# Patient Record
Sex: FEMALE | Race: WHITE | NOT HISPANIC OR LATINO | Employment: UNEMPLOYED | URBAN - NONMETROPOLITAN AREA
[De-identification: names, ages, dates, MRNs, and addresses within clinical notes are randomized per-mention and may not be internally consistent; named-entity substitution may affect disease eponyms.]

---

## 2017-08-04 ENCOUNTER — TRANSCRIBE ORDERS (OUTPATIENT)
Dept: ADMINISTRATIVE | Facility: HOSPITAL | Age: 42
End: 2017-08-04

## 2017-08-04 DIAGNOSIS — Z12.31 VISIT FOR SCREENING MAMMOGRAM: Primary | ICD-10-CM

## 2017-11-02 ENCOUNTER — TRANSCRIBE ORDERS (OUTPATIENT)
Dept: ADMINISTRATIVE | Facility: HOSPITAL | Age: 42
End: 2017-11-02

## 2017-11-02 DIAGNOSIS — Z12.31 VISIT FOR SCREENING MAMMOGRAM: Primary | ICD-10-CM

## 2017-11-27 ENCOUNTER — HOSPITAL ENCOUNTER (OUTPATIENT)
Dept: MAMMOGRAPHY | Facility: HOSPITAL | Age: 42
Discharge: HOME | End: 2017-11-27
Attending: INTERNAL MEDICINE | Admitting: INTERNAL MEDICINE

## 2017-11-27 DIAGNOSIS — Z12.31 VISIT FOR SCREENING MAMMOGRAM: ICD-10-CM

## 2017-11-27 PROCEDURE — 77067 SCR MAMMO BI INCL CAD: CPT | Performed by: RADIOLOGY

## 2017-11-27 PROCEDURE — 77063 BREAST TOMOSYNTHESIS BI: CPT | Performed by: RADIOLOGY

## 2017-11-27 PROCEDURE — 77063 BREAST TOMOSYNTHESIS BI: CPT

## 2017-11-27 PROCEDURE — G0202 SCR MAMMO BI INCL CAD: HCPCS

## 2018-06-04 ENCOUNTER — DOCUMENTATION (OUTPATIENT)
Dept: BARIATRICS/WEIGHT MGMT | Facility: CLINIC | Age: 43
End: 2018-06-04

## 2018-06-04 DIAGNOSIS — R73.03 PREDIABETES: ICD-10-CM

## 2018-06-04 DIAGNOSIS — R06.00 DYSPNEA, UNSPECIFIED TYPE: Primary | ICD-10-CM

## 2018-06-04 DIAGNOSIS — R53.83 FATIGUE, UNSPECIFIED TYPE: ICD-10-CM

## 2018-06-11 DIAGNOSIS — R53.83 FATIGUE, UNSPECIFIED TYPE: ICD-10-CM

## 2018-06-11 DIAGNOSIS — R73.03 PREDIABETES: ICD-10-CM

## 2018-06-11 DIAGNOSIS — R06.00 DYSPNEA, UNSPECIFIED TYPE: ICD-10-CM

## 2018-06-12 ENCOUNTER — OFFICE VISIT (OUTPATIENT)
Dept: PSYCHIATRY | Facility: CLINIC | Age: 43
End: 2018-06-12

## 2018-06-12 ENCOUNTER — OFFICE VISIT (OUTPATIENT)
Dept: BARIATRICS/WEIGHT MGMT | Facility: CLINIC | Age: 43
End: 2018-06-12

## 2018-06-12 VITALS
TEMPERATURE: 97.6 F | SYSTOLIC BLOOD PRESSURE: 159 MMHG | OXYGEN SATURATION: 99 % | DIASTOLIC BLOOD PRESSURE: 87 MMHG | BODY MASS INDEX: 47.09 KG/M2 | HEART RATE: 85 BPM | HEIGHT: 66 IN | RESPIRATION RATE: 18 BRPM | WEIGHT: 293 LBS

## 2018-06-12 DIAGNOSIS — R10.13 DYSPEPSIA: Primary | ICD-10-CM

## 2018-06-12 DIAGNOSIS — E66.01 MORBID OBESITY WITH BMI OF 60.0-69.9, ADULT (HCC): ICD-10-CM

## 2018-06-12 DIAGNOSIS — J30.2 SEASONAL ALLERGIC RHINITIS, UNSPECIFIED CHRONICITY, UNSPECIFIED TRIGGER: ICD-10-CM

## 2018-06-12 DIAGNOSIS — R10.13 DYSPEPSIA: ICD-10-CM

## 2018-06-12 DIAGNOSIS — G43.909 MIGRAINE WITHOUT STATUS MIGRAINOSUS, NOT INTRACTABLE, UNSPECIFIED MIGRAINE TYPE: ICD-10-CM

## 2018-06-12 DIAGNOSIS — E66.01 MORBID OBESITY (HCC): ICD-10-CM

## 2018-06-12 DIAGNOSIS — I10 HYPERTENSION, UNSPECIFIED TYPE: Primary | ICD-10-CM

## 2018-06-12 DIAGNOSIS — F32.A DEPRESSION, UNSPECIFIED DEPRESSION TYPE: ICD-10-CM

## 2018-06-12 DIAGNOSIS — R60.0 LOWER EXTREMITY EDEMA: ICD-10-CM

## 2018-06-12 DIAGNOSIS — F41.9 ANXIETY: ICD-10-CM

## 2018-06-12 DIAGNOSIS — R73.03 PREDIABETES: ICD-10-CM

## 2018-06-12 DIAGNOSIS — R12 HEARTBURN: ICD-10-CM

## 2018-06-12 DIAGNOSIS — F32.A DEPRESSION, UNSPECIFIED DEPRESSION TYPE: Primary | ICD-10-CM

## 2018-06-12 DIAGNOSIS — R06.02 SHORTNESS OF BREATH: ICD-10-CM

## 2018-06-12 DIAGNOSIS — G47.33 OSA (OBSTRUCTIVE SLEEP APNEA): ICD-10-CM

## 2018-06-12 DIAGNOSIS — M25.50 ARTHRALGIA, UNSPECIFIED JOINT: ICD-10-CM

## 2018-06-12 DIAGNOSIS — R00.2 PALPITATIONS: ICD-10-CM

## 2018-06-12 LAB
ALBUMIN SERPL-MCNC: 3.95 G/DL (ref 3.2–4.8)
ALBUMIN/GLOB SERPL: 1.3 G/DL (ref 1.5–2.5)
ALP SERPL-CCNC: 94 U/L (ref 25–100)
ALT SERPL-CCNC: 15 U/L (ref 7–40)
AST SERPL-CCNC: 16 U/L (ref 0–33)
BILIRUB SERPL-MCNC: 0.4 MG/DL (ref 0.3–1.2)
BUN SERPL-MCNC: 11 MG/DL (ref 9–23)
BUN/CREAT SERPL: 17.5 (ref 7–25)
CALCIUM SERPL-MCNC: 8.7 MG/DL (ref 8.7–10.4)
CHLORIDE SERPL-SCNC: 99 MMOL/L (ref 99–109)
CHOLEST SERPL-MCNC: 160 MG/DL (ref 0–200)
CO2 SERPL-SCNC: 26 MMOL/L (ref 20–31)
CREAT SERPL-MCNC: 0.63 MG/DL (ref 0.6–1.3)
ERYTHROCYTE [DISTWIDTH] IN BLOOD BY AUTOMATED COUNT: 14.1 % (ref 11.3–14.5)
GFR SERPLBLD CREATININE-BSD FMLA CKD-EPI: 103 ML/MIN/1.73
GFR SERPLBLD CREATININE-BSD FMLA CKD-EPI: 125 ML/MIN/1.73
GLOBULIN SER CALC-MCNC: 3.2 GM/DL
GLUCOSE SERPL-MCNC: 106 MG/DL (ref 70–100)
HBA1C MFR BLD: 5.9 % (ref 4.8–5.6)
HCT VFR BLD AUTO: 37.7 % (ref 34.5–44)
HDLC SERPL-MCNC: 48 MG/DL (ref 40–60)
HGB BLD-MCNC: 11.8 G/DL (ref 11.5–15.5)
LDLC SERPL CALC-MCNC: 67 MG/DL (ref 0–100)
MCH RBC QN AUTO: 26.4 PG (ref 27–31)
MCHC RBC AUTO-ENTMCNC: 31.3 G/DL (ref 32–36)
MCV RBC AUTO: 84.3 FL (ref 80–99)
PLATELET # BLD AUTO: 318 10*3/MM3 (ref 150–450)
POTASSIUM SERPL-SCNC: 4 MMOL/L (ref 3.5–5.5)
PROT SERPL-MCNC: 7.1 G/DL (ref 5.7–8.2)
RBC # BLD AUTO: 4.47 10*6/MM3 (ref 3.89–5.14)
SODIUM SERPL-SCNC: 135 MMOL/L (ref 132–146)
TRIGL SERPL-MCNC: 223 MG/DL (ref 0–150)
TSH SERPL DL<=0.005 MIU/L-ACNC: 2.22 MIU/ML (ref 0.35–5.35)
VLDLC SERPL CALC-MCNC: 44.6 MG/DL
WBC # BLD AUTO: 9.06 10*3/MM3 (ref 3.5–10.8)

## 2018-06-12 PROCEDURE — 99204 OFFICE O/P NEW MOD 45 MIN: CPT | Performed by: PHYSICIAN ASSISTANT

## 2018-06-12 PROCEDURE — 90791 PSYCH DIAGNOSTIC EVALUATION: CPT | Performed by: PSYCHOLOGIST

## 2018-06-12 RX ORDER — METHOCARBAMOL 750 MG/1
750 TABLET, FILM COATED ORAL 2 TIMES DAILY
Refills: 0 | COMMUNITY
Start: 2018-05-10

## 2018-06-12 RX ORDER — VENLAFAXINE HYDROCHLORIDE 75 MG/1
75 CAPSULE, EXTENDED RELEASE ORAL DAILY
Refills: 0 | COMMUNITY
Start: 2018-05-10 | End: 2021-06-28 | Stop reason: ALTCHOICE

## 2018-06-12 RX ORDER — SODIUM CHLORIDE 9 MG/ML
100 INJECTION, SOLUTION INTRAVENOUS CONTINUOUS
Status: CANCELLED | OUTPATIENT
Start: 2018-06-12

## 2018-06-12 RX ORDER — IBUPROFEN 800 MG/1
800 TABLET ORAL EVERY 6 HOURS PRN
COMMUNITY

## 2018-06-12 RX ORDER — MONTELUKAST SODIUM 10 MG/1
10 TABLET ORAL DAILY
Refills: 0 | COMMUNITY
Start: 2018-05-17

## 2018-06-12 NOTE — PROGRESS NOTES
PROGRESS NOTE    Data:  Ashley Bartlett is a 42 y.o. female who met with the undersigned for a scheduled individual outpatient therapy session from 1:00 - 1:44pm.      Clinical Maneuvering/Intervention:      Pt talked about struggling with being over weight since about 5th grade. She is primarily motivated to get weight loss surgery/lose weight so that she can be more active in life and to improve her overall health. The pt talked about how she has thought about getting the surgery for the past 15 years, but reached a turning point where she gathered more information, started seeing the surgery as a possibility, and in turn, committing to the journey in order to get it.  A psychological evaluation was conducted in order to assess past and current level of functioning. Areas assessed included, but were not limited to: perception of social support, perception of ability to face and deal with challenges in life (positive functioning), anxiety symptoms, depressive symptoms, perspective on beliefs/belief system, coping skills for stress, intelligence level, etc. Therapeutic rapport was established. She has battled depression quite a bit for the past three years which she attributes to losing her brother 3 years ago, her father this past January, and then losing several other family members since then. She then provided examples of how she is coping and healing from these losses. Stressors in life were deemed minimal: her sister's children living with her, arguing family members, and money being tight (but per pt, they make ends meet). Interventions conducted today were geared towards assessing her readiness for weight loss surgery and highlighting any counseling needs. She reports different things she is doing in order to heal from the loss of loved ones and how she manages depressive symptoms; thus, major counseling issues needing attention were not identified. She has not started making many dietary changes since, per  pt, she just learned today about how she needs to change her diet. She was able to describe several people who support her in getting weight loss surgery.     Mental Status Exam  Hygiene:  good  Dress: normal  Attitude:  cooperative and proactive  Motor Activity: normal  Speech: normal  Mood:   level  Affect:  congruent  Thought Processes: normal  Thought Content:  normal  Suicidal Thoughts:  not endorsed  Homicidal Thoughts:  not endorsed  Crisis Safety Plan: not needed   Hallucinations:  none      Patient's Support Network Includes:  family, friends      Progress toward goal: there is evidence to suggest that she is taking measures to improve the quality of her life including seeking weight loss surgery       Functional Status: moderate      Prognosis: good with weight loss and continuing to take medication to manage mood    Assessment      The pt presented with low-grade depression due primarily to loss of several loved ones. She is also morbidly obese. She presented with good coping skills for stress and signs that she is healing from her losses.     From a psychological standpoint, the pt presents as a good candidate for bariatric surgery.  She is motivated for the surgery, has showed willingness for the lifestyle change in terms of adjusting her eating habits, and seems to have appropriate expectations of how to prepare and how to live after surgery in order to lose weight successfully.      Plan      In order to diminish symptoms of depression, the pt will continue to take mood medication as prescribed (ongoing) and follow up with her bariatric surgeon in order to receive weight loss surgery (as soon as feasible/appropriate).     Aranza Holt, PhD, LP

## 2018-06-12 NOTE — PROGRESS NOTES
[facility]  [address]  [facility] [id number] [phone]      Patient  Name:  Ashley Bartlett  :  1975      Date of Visit: 2018      Chief Complaint:  weight gain; unable to maintain weight loss    History of Present Illness:  Ashley Barltett is a 42 y.o. female who presents today for evaluation, education and consultation regarding weight loss surgery. The patient is interested in sleeve gastrectomy.     Ashley has been overweight for at least 33 years, has been 35 pounds or more overweight for at least 33 years, has been 100 pounds or more overweight for 25 or more years and started dieting at age 14.      Previous diet attempts include: Cabbage Soup, Fasting and Slim Fast; None; Wellbutrin.  The most weight Ashley lost was 60 pounds on exercise but was only able to maintain that weight loss for 6 months.  Her maximum lifetime weight is 415 pounds.    As above, patient has been overweight for many years, with numerous failed dietary/weight loss attempts.  She now has obesity related comorbidities and as such has decided to pursue weight loss surgery. Purusing WLS to better her health and be able to do more things.  She knows someone who had surgery with Dr. Alves and is doing well.     GI history: occasional heartburn, takes tums/ rolaids PRN.  Has had EGD many years ago, unremarkable. S/p lap laney. No current GI complaints.     All past medical, surgical, social and family history have been obtained and discussed as pertinent to bariatric surgery as below.     Past Medical History:   Diagnosis Date   • Anxiety    • Boil     h/o multiple boils, has required I&D, unknown staph or MRSA   • Depression    • Dyspepsia    • Fatigue    • Heartburn     tums/ rolaids prn, EGD years ago- unremarkable.  Denies h/o h pylori.    • Hypertension    • Joint pain     knees, hips, shoulders, elbows, wrists.   Takes ibuprofen PRN, muscle relaxers. No injections.     • Lower extremity edema    • Migraines     ibuprofen or tylenol PRN.    • Morbid obesity with BMI of 60.0-69.9, adult    • JAY (obstructive sleep apnea)     does not use CPAP   • Palpitations     negative holter monitor   • Paresthesia of both hands    • Prediabetes     was on metformin, is off this.     • Seasonal allergies    • Shortness of breath     wtih exertion     Past Surgical History:   Procedure Laterality Date   • BREAST BIOPSY Right 2015    fibroadenoma   •  SECTION  ,     midline   • LAPAROSCOPIC CHOLECYSTECTOMY      cholelithiasis   • TONSILLECTOMY     • TUBAL ABDOMINAL LIGATION      with  section       No Known Allergies    Current Outpatient Prescriptions:   •  ibuprofen (ADVIL,MOTRIN) 200 MG tablet, Take 200 mg by mouth Every 6 (Six) Hours As Needed for Mild Pain ., Disp: , Rfl:   •  lisinopril-hydrochlorothiazide (PRINZIDE,ZESTORETIC) 10-12.5 MG per tablet, Take 1 tablet by mouth daily., Disp: , Rfl:   •  loratadine (CLARITIN) 10 MG tablet, Take 10 mg by mouth daily., Disp: , Rfl:   •  methocarbamol (ROBAXIN) 750 MG tablet, , Disp: , Rfl: 0  •  montelukast (SINGULAIR) 10 MG tablet, , Disp: , Rfl: 0  •  tiZANidine (ZANAFLEX) 4 MG tablet, Take 4 mg by mouth at night as needed for muscle spasms., Disp: , Rfl:   •  venlafaxine XR (EFFEXOR-XR) 75 MG 24 hr capsule, , Disp: , Rfl: 0  •  escitalopram (LEXAPRO) 10 MG tablet, Take 10 mg by mouth daily., Disp: , Rfl:   •  ondansetron ODT (ZOFRAN-ODT) 4 MG disintegrating tablet, Take 1 tablet by mouth Every 6 (Six) Hours As Needed for nausea or vomiting., Disp: 12 tablet, Rfl: 0    Social History     Social History   • Marital status:      Spouse name: N/A   • Number of children: 2   • Years of education: High School      Occupational History   • Not on file.     Social History Main Topics   • Smoking status: Never Smoker   • Smokeless tobacco: Never Used   • Alcohol use Yes      Comment: very little, 4oz in 6  months   • Drug use: No   • Sexual activity: Defer      Comment: no hormones     Other Topics Concern   • Not on file     Social History Narrative    [place] [facility] with mother.   Disabled from knee and joint pain.     Family History   Problem Relation Age of Onset   • Diabetes Mother    • Hypertension Mother    • Diabetes Father    • Hypertension Father    • Obesity Maternal Grandmother    • Diabetes Maternal Grandmother    • Hypertension Maternal Grandmother    • Heart attack Maternal Grandmother    • Diabetes Maternal Grandfather    • Heart attack Maternal Grandfather    • Obesity Paternal Grandmother    • Hypertension Paternal Grandmother    • Heart attack Paternal Grandmother    • Hypertension Paternal Grandfather    • Heart attack Paternal Grandfather    • Breast cancer Neg Hx        Review of Systems:  Constitutional:  Reports fatigue, weight gain and denies fevers, chills.  HEENT:  seasonal allergies  Cardiovascular:  Reports HTN, palpitations, edema and denies HLD, CAD, Atrial Fib, hx heart disease, heart murmur, hx MI, chest pain, hx DVT.  Respiratory:  Reports shortness of breath , sleep apnea and denies cough , wheezing, asthma, hx PE.  Gastrointestinal:  Reports heartburn, gallbladder issues and denies dysphagia, nausea, vomiting, abdominal pain, IBS, diarrhea, constipation, melena, blood in stool, liver disease, hx pancreatitis.  Genitourinary:  Reports none and denies history of  frequent UTI, incontinence, hematuria, dysuria, polyuria, polydipsia, renal insufficiency, renal failure.    Musculoskeletal:  Reports joint pain, back pain and denies fibromyalgia, arthritis and autoimmune disease.  Neurological:  Reports headaches, migraines and denies numbness /tingling, dizziness, confusion, seizure, stroke.  Psychiatric:  Reports anxiety, depression and denies bipolar disorder, hx suicide attempt, hx self injury, eating disorder.  Endocrine:  Reports glucose intolerance and denies  diabetes, thyroid disease, gout.  Hematologic:  Reports none and denies anemia, bleeding disorder, hx blood transfusion.  Skin:  Reports boils and denies hx MRSA.      Physical Exam:  Vital Signs:  Weight: (!) 179 kg (394 lb 0.2 oz)   Body mass index is 63.59 kg/m².  Temp: 97.6 °F (36.4 °C)   Heart Rate: 85   BP: 159/87     Physical Exam   Constitutional: She is oriented to person, place, and time. She appears well-developed and well-nourished.   Yawning repeatedly   HENT:   Head: Normocephalic and atraumatic.   Mouth/Throat: Oropharynx is clear and moist.   Eyes: EOM are normal.   Neck: Normal range of motion. Neck supple. No thyromegaly present.   Cardiovascular: Normal rate, regular rhythm and normal heart sounds.    Pulmonary/Chest: Effort normal and breath sounds normal. No respiratory distress. She has no wheezes.   Abdominal: Soft. Bowel sounds are normal. She exhibits no distension. There is no tenderness.   Lower midline scar. Lap scars.   Musculoskeletal: Normal range of motion. She exhibits edema.   Neurological: She is alert and oriented to person, place, and time.   Skin: Skin is warm and dry.   tattoo   Psychiatric: She has a normal mood and affect. Her behavior is normal. Judgment and thought content normal.   Vitals reviewed.      Patient Active Problem List   Diagnosis   • Anxiety   • JAY (obstructive sleep apnea)   • Hypertension   • Depression   • Seasonal allergies   • Joint pain   • Heartburn   • Lower extremity edema   • Shortness of breath   • Paresthesia of both hands   • Palpitations   • Migraines   • Prediabetes   • Boil   • Fatigue   • Morbid obesity with BMI of 60.0-69.9, adult   • Dyspepsia       Assessment:    Ashley Bartlett is a 42 y.o. year old female with medically complicated obesity pursuing sleeve gastrectomy.    Weight loss surgery is deemed medically necessary given the following obesity related comorbidities including sleep apnea, hypertension, back pain, knee pain, GERD, gall  bladder disease, edema and depression with current Weight: (!) 179 kg (394 lb 0.2 oz) and Body mass index is 63.59 kg/m²..    Plan:  The consultation plan and program requirements were reviewed with the patient.  The patient has been advised that a letter of medical support must be obtained from her primary care physician or referring provider. A psychological evaluation will be arranged.  A nutritional evaluation will be performed.  The patient was advised to start a high protein and low carbohydrate diet.  Necessary lifestyle modifications were discussed.  Instructions on how to access JIMENA was given to the patient.  JIMENA is an internet based educational video that explains the surgical procedure chosen and answers basic questions regarding that procedure.     Preoperative testing will include: CBC, CMP, Lipids, TSH, HgA1C, H.Pylori, CXR, EKG and EGD     Additional preop clearances required prior to surgery: Cardiac. Patient will schedule with home cardiologist.      The patient has been educated on expected postoperative lifestyle changes, including commitment to high protein diet, vitamin regimen, and exercise program.  They are aware that support groups are encouraged for optimal weight loss results. Patient understands that bariatric surgery is not cosmetic surgery but rather a tool to help make a lifelong commitment to lifestyle changes including diet, exercise, behavior modifications, and healthy habits. The procedure was discussed with the patient and all questions were answered. The importance of avoiding ASA/ NSAIDS/ steroids/ tobacco/ hormones/ immunomodulators perioperatively was discussed.         Ysabel Gilliland PA-C

## 2018-06-13 ENCOUNTER — DOCUMENTATION (OUTPATIENT)
Dept: BARIATRICS/WEIGHT MGMT | Facility: CLINIC | Age: 43
End: 2018-06-13

## 2018-06-13 NOTE — PROGRESS NOTES
"Weight Loss Surgery  Presurgical Nutrition Assessment     Ashley Bartlett  2018 (Dietitian Consult /c pt on 2018  [id number]  [id number]  1975  female    Surgery desired: Sleeve Gastrectomy    Ht 167.7 cm (66\"); Wt 179 kg (394 #); BMI 63.6  Past Medical History:   Diagnosis Date   • Anxiety    • Boil     h/o multiple boils, has required I&D, unknown staph or MRSA   • Depression    • Dyspepsia    • Fatigue    • Heartburn     tums/ rolaids prn, EGD years ago- unremarkable.  Denies h/o h pylori.    • Hypertension    • Joint pain     knees, hips, shoulders, elbows, wrists.   Takes ibuprofen PRN, muscle relaxers. No injections.    • Lower extremity edema    • Migraines     ibuprofen or tylenol PRN.    • Morbid obesity with BMI of 60.0-69.9, adult    • JAY (obstructive sleep apnea)     does not use CPAP   • Palpitations     negative holter monitor/ cardiac workup. Has seen cardiologist a year ago.    • Paresthesia of both hands    • Prediabetes     was on metformin, better controlled and is off this now.    • Seasonal allergies    • Shortness of breath     wtih exertion     Past Surgical History:   Procedure Laterality Date   • BREAST BIOPSY Right 2015    fibroadenoma   •  SECTION  ,     midline   • LAPAROSCOPIC CHOLECYSTECTOMY      cholelithiasis   • TONSILLECTOMY     • TUBAL ABDOMINAL LIGATION      with  section     No Known Allergies    Current Outpatient Prescriptions:   •  escitalopram (LEXAPRO) 10 MG tablet, Take 10 mg by mouth daily., Disp: , Rfl:   •  ibuprofen (ADVIL,MOTRIN) 200 MG tablet, Take 200 mg by mouth Every 6 (Six) Hours As Needed for Mild Pain ., Disp: , Rfl:   •  lisinopril-hydrochlorothiazide (PRINZIDE,ZESTORETIC) 10-12.5 MG per tablet, Take 1 tablet by mouth daily., Disp: , Rfl:   •  loratadine (CLARITIN) 10 MG tablet, Take 10 mg by mouth daily., Disp: , Rfl:   •  methocarbamol (ROBAXIN) 750 MG tablet, , Disp: , Rfl: 0  •  montelukast (SINGULAIR) 10 " "MG tablet, , Disp: , Rfl: 0  •  ondansetron ODT (ZOFRAN-ODT) 4 MG disintegrating tablet, Take 1 tablet by mouth Every 6 (Six) Hours As Needed for nausea or vomiting., Disp: 12 tablet, Rfl: 0  •  tiZANidine (ZANAFLEX) 4 MG tablet, Take 4 mg by mouth at night as needed for muscle spasms., Disp: , Rfl:   •  venlafaxine XR (EFFEXOR-XR) 75 MG 24 hr capsule, , Disp: , Rfl: 0      Nutrition Assessment    Estimated energy needs:  2460 kcal    Estimated calories for weight loss:  1500 kcal    IBW (Pounds):  155 #        Excess body weight (Pounds):  240 #       Nutrition Recall  24 Hour recall: (B) (L) (D) -  Reviewed and discussed with patient.  Drinks 16 - 32 oz Pepsi qd @ around 8:30 am.  From 6-6:45 pt had \"breakfast for dinner\" /c 2 biscuits, 2 eggs, 2 slices carpenter & 4 thin slices of ham (~ 2 -oz).  Dessert was 1 piece strawberry cheesecake.  At 9 pm had 1.5 c.fresh pineapple.  \"Chomps\" on ice throughout the day. Soda intake very excessive.  Portion sizes large.  Sub-optimal protein intake.       Exercise  never      Education    Provided information packet re:  Sleeve Gastrectomy  1. Reviewed guidelines for higher protein, limited carbohydrate diet to promote weight loss.  Encouraged patient to incorporate these principles of healthy eating from now until approximately 2 weeks prior to bariatric surgery date, when an even lower carbohydrate “liver-shrinking” regimen will be followed. (Information sheet re pre-op diet given).  Explained that after recovery from surgery this diet will again be followed to ensure further loss and for weight maintenance.    2. Encouraged patient to choose an acceptable protein supplement powder or shake for post-surgery liquid diet.  Provided product guidelines and examples.    3. Explained importance of goal setting to help in changing eating behaviors that are not conducive to weight loss.  Targeted several on a worksheet which also included spaces for patient to work on issues specific " to them.  4. Provided follow-up options for support, including contact information for dietitians here, if desired.  Web-based support information and apps for smart phones and computers given.  Noted that monthly support group is offered at this clinic, and that support is associated with successful weight loss.    Recommend that team proceed with surgery and follow per protocol.      Nutrition Goals   Dietary Guidelines per information packet as described above  Protein goal:  grams per day   Carbohydrate goal:  100-140 grams per day  Eliminate soda, sweet tea, etc.     Exercise Goals  Continue current exercise routine   Add 15-30 minutes of activity per day as tolerated      Ana Varela, ESTHER  06/13/2018  4:40 PM

## 2018-07-01 ENCOUNTER — APPOINTMENT (OUTPATIENT)
Dept: PREADMISSION TESTING | Facility: HOSPITAL | Age: 43
End: 2018-07-01

## 2018-07-01 LAB
DEPRECATED RDW RBC AUTO: 40.9 FL (ref 37–54)
ERYTHROCYTE [DISTWIDTH] IN BLOOD BY AUTOMATED COUNT: 13.3 % (ref 11.3–14.5)
HCT VFR BLD AUTO: 37.6 % (ref 34.5–44)
HGB BLD-MCNC: 12 G/DL (ref 11.5–15.5)
MCH RBC QN AUTO: 27.1 PG (ref 27–31)
MCHC RBC AUTO-ENTMCNC: 31.9 G/DL (ref 32–36)
MCV RBC AUTO: 85.1 FL (ref 80–99)
PLATELET # BLD AUTO: 315 10*3/MM3 (ref 150–450)
PMV BLD AUTO: 10.4 FL (ref 6–12)
RBC # BLD AUTO: 4.42 10*6/MM3 (ref 3.89–5.14)
WBC NRBC COR # BLD: 8.28 10*3/MM3 (ref 3.5–10.8)

## 2018-07-01 PROCEDURE — 93010 ELECTROCARDIOGRAM REPORT: CPT | Performed by: INTERNAL MEDICINE

## 2018-07-01 PROCEDURE — 36415 COLL VENOUS BLD VENIPUNCTURE: CPT

## 2018-07-01 PROCEDURE — 85027 COMPLETE CBC AUTOMATED: CPT | Performed by: ANESTHESIOLOGY

## 2018-07-01 PROCEDURE — 93005 ELECTROCARDIOGRAM TRACING: CPT

## 2018-07-01 RX ORDER — LISINOPRIL 40 MG/1
60 TABLET ORAL DAILY
COMMUNITY

## 2018-07-01 RX ORDER — HYDROCHLOROTHIAZIDE 25 MG/1
12.5 TABLET ORAL DAILY
COMMUNITY
End: 2021-06-28 | Stop reason: ALTCHOICE

## 2018-07-05 ENCOUNTER — HOSPITAL ENCOUNTER (OUTPATIENT)
Facility: HOSPITAL | Age: 43
Setting detail: HOSPITAL OUTPATIENT SURGERY
Discharge: HOME | End: 2018-07-05
Attending: SURGERY | Admitting: ANESTHESIOLOGY

## 2018-07-05 ENCOUNTER — ANESTHESIA (OUTPATIENT)
Dept: GASTROENTEROLOGY | Facility: HOSPITAL | Age: 43
End: 2018-07-05

## 2018-07-05 ENCOUNTER — ANESTHESIA EVENT (OUTPATIENT)
Dept: GASTROENTEROLOGY | Facility: HOSPITAL | Age: 43
End: 2018-07-05

## 2018-07-05 VITALS
OXYGEN SATURATION: 99 % | TEMPERATURE: 97.8 F | SYSTOLIC BLOOD PRESSURE: 142 MMHG | DIASTOLIC BLOOD PRESSURE: 86 MMHG | HEART RATE: 73 BPM | RESPIRATION RATE: 20 BRPM

## 2018-07-05 DIAGNOSIS — R12 HEARTBURN: ICD-10-CM

## 2018-07-05 LAB
B-HCG UR QL: NEGATIVE
INTERNAL NEGATIVE CONTROL: NORMAL
INTERNAL POSITIVE CONTROL: POSITIVE
Lab: NORMAL

## 2018-07-05 PROCEDURE — 88305 TISSUE EXAM BY PATHOLOGIST: CPT | Performed by: SURGERY

## 2018-07-05 PROCEDURE — 25010000002 PROPOFOL 10 MG/ML EMULSION: Performed by: NURSE ANESTHETIST, CERTIFIED REGISTERED

## 2018-07-05 PROCEDURE — 43239 EGD BIOPSY SINGLE/MULTIPLE: CPT | Performed by: SURGERY

## 2018-07-05 RX ORDER — LIDOCAINE HYDROCHLORIDE 10 MG/ML
0.5 INJECTION, SOLUTION EPIDURAL; INFILTRATION; INTRACAUDAL; PERINEURAL ONCE AS NEEDED
Status: DISCONTINUED | OUTPATIENT
Start: 2018-07-05 | End: 2018-07-05 | Stop reason: HOSPADM

## 2018-07-05 RX ORDER — SODIUM CHLORIDE 9 MG/ML
100 INJECTION, SOLUTION INTRAVENOUS CONTINUOUS
Status: DISCONTINUED | OUTPATIENT
Start: 2018-07-05 | End: 2018-07-05 | Stop reason: HOSPADM

## 2018-07-05 RX ORDER — LIDOCAINE HYDROCHLORIDE 10 MG/ML
INJECTION, SOLUTION EPIDURAL; INFILTRATION; INTRACAUDAL; PERINEURAL AS NEEDED
Status: DISCONTINUED | OUTPATIENT
Start: 2018-07-05 | End: 2018-07-05 | Stop reason: SURG

## 2018-07-05 RX ORDER — PANTOPRAZOLE SODIUM 40 MG/10ML
40 INJECTION, POWDER, LYOPHILIZED, FOR SOLUTION INTRAVENOUS ONCE
Status: COMPLETED | OUTPATIENT
Start: 2018-07-05 | End: 2018-07-05

## 2018-07-05 RX ORDER — ONDANSETRON 2 MG/ML
4 INJECTION INTRAMUSCULAR; INTRAVENOUS ONCE AS NEEDED
Status: DISCONTINUED | OUTPATIENT
Start: 2018-07-05 | End: 2018-07-05 | Stop reason: HOSPADM

## 2018-07-05 RX ORDER — PROPOFOL 10 MG/ML
VIAL (ML) INTRAVENOUS AS NEEDED
Status: DISCONTINUED | OUTPATIENT
Start: 2018-07-05 | End: 2018-07-05 | Stop reason: SURG

## 2018-07-05 RX ORDER — SODIUM CHLORIDE, SODIUM LACTATE, POTASSIUM CHLORIDE, CALCIUM CHLORIDE 600; 310; 30; 20 MG/100ML; MG/100ML; MG/100ML; MG/100ML
9 INJECTION, SOLUTION INTRAVENOUS CONTINUOUS
Status: DISCONTINUED | OUTPATIENT
Start: 2018-07-05 | End: 2018-07-05 | Stop reason: HOSPADM

## 2018-07-05 RX ORDER — SODIUM CHLORIDE 0.9 % (FLUSH) 0.9 %
1-10 SYRINGE (ML) INJECTION AS NEEDED
Status: DISCONTINUED | OUTPATIENT
Start: 2018-07-05 | End: 2018-07-05 | Stop reason: HOSPADM

## 2018-07-05 RX ORDER — FAMOTIDINE 10 MG/ML
20 INJECTION, SOLUTION INTRAVENOUS ONCE
Status: DISCONTINUED | OUTPATIENT
Start: 2018-07-05 | End: 2018-07-05 | Stop reason: HOSPADM

## 2018-07-05 RX ORDER — FAMOTIDINE 20 MG/1
20 TABLET, FILM COATED ORAL ONCE
Status: DISCONTINUED | OUTPATIENT
Start: 2018-07-05 | End: 2018-07-05 | Stop reason: HOSPADM

## 2018-07-05 RX ADMIN — LIDOCAINE HYDROCHLORIDE 100 MG: 10 INJECTION, SOLUTION EPIDURAL; INFILTRATION; INTRACAUDAL; PERINEURAL at 09:37

## 2018-07-05 RX ADMIN — PANTOPRAZOLE SODIUM 40 MG: 40 INJECTION, POWDER, FOR SOLUTION INTRAVENOUS at 08:35

## 2018-07-05 RX ADMIN — SODIUM CHLORIDE 100 ML/HR: 9 INJECTION, SOLUTION INTRAVENOUS at 08:35

## 2018-07-05 RX ADMIN — PROPOFOL 250 MG: 10 INJECTION, EMULSION INTRAVENOUS at 09:37

## 2018-07-05 RX ADMIN — SODIUM CHLORIDE, POTASSIUM CHLORIDE, SODIUM LACTATE AND CALCIUM CHLORIDE: 600; 310; 30; 20 INJECTION, SOLUTION INTRAVENOUS at 09:35

## 2018-07-05 NOTE — OP NOTE
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  I did one of her friend's LSG's.  Her 20 yo daughter is having LSG w me as well, also having her EGD today w me to follow.  Her mother smokes in the car, not recently in the house since a baby living there.  Patient voiced understanding that it is my strong recommendation NOT to proceed with LSG if cannot be tobacco and 2nd hand smoke free for at least 2 wks pre and 6 wks postoperatively as the risk of leak is prohibitive.    She has a long-standing hx of heartburn, takes OTC prn, no dysphagia, no prior UGI evaluation.   Please see our office notes.   Risks, benefits and alternative therapies were discussed and the patient wishes to proceed with diagnostic possible therapeutic upper endoscopy    Operative Technique:  The patient was brought to the endoscopy suite and placed supine upon the stretcher. A bite block was placed.  The patient was sedated by the anesthesiology staff and the standard flexible endoscope was advanced under direct visualization into the posterior pharynx, vocal cords appeared normal and the esophagus was intubated and the endoscope advanced easily through the esophagus, and into the gastric cavity. The pylorus was readily identified and intubated and the endoscope advanced into the first and second portions of the duodenum.  The endoscope was withdrawn to the antrum and a biopsy was obtained.  Retroflexed examination was performed visualizing the hiatus, cardia, fundus and body.   The endoscope was withdrawn to the Z line and photodocumentation obtained.  The endoscope was slowly withdrawn, no new abnormalities noted.      Prox esoph - no strictures, distention, retained secretions or tertiary  spasms.  Distal esoph - no hiatal hernia, Shaw's or gross esophagitis.  Gastric mucosa grossly normal, perhaps mild diffuse gastritis.  Pylorus not deformed or in spasm.  Duodenum unremarkable.  Bx fundus. Retroflexed exam no HH or other abnormalities of the cardia, body or fundus.  Zline 39 cm, bx above the zline obtained.        The patient tolerated the procedure well without complication and was taken to the recovery room in stable condition.

## 2018-07-05 NOTE — ANESTHESIA PREPROCEDURE EVALUATION
Anesthesia Evaluation     Patient summary reviewed and Nursing notes reviewed   no history of anesthetic complications:  NPO Solid Status: > 8 hours  NPO Liquid Status: > 8 hours           Airway   Mallampati: II  TM distance: >3 FB  Neck ROM: full  No difficulty expected  Dental - normal exam     Pulmonary - normal exam    breath sounds clear to auscultation  (+) sleep apnea (No CPAP),   Cardiovascular - normal exam    ECG reviewed  Rhythm: regular  Rate: normal    (+) hypertension,       Neuro/Psych- negative ROS  GI/Hepatic/Renal/Endo    (+) morbid obesity, GERD (mild),  diabetes mellitus (borderline),     Musculoskeletal     Abdominal    Substance History      OB/GYN          Other                        Anesthesia Plan    ASA 3     general     intravenous induction   Anesthetic plan and risks discussed with patient.    Plan discussed with CRNA.

## 2018-07-05 NOTE — H&P (VIEW-ONLY)
[facility] GROUP BARIATRIC SURGERY  [address]  [facility] [address] [phone]      Patient  Name:  Ashley Bartlett  :  1975      Date of Visit: 2018      Chief Complaint:  weight gain; unable to maintain weight loss    History of Present Illness:  Ashley Bartlett is a 42 y.o. female who presents today for evaluation, education and consultation regarding weight loss surgery. The patient is interested in sleeve gastrectomy.     Ashley has been overweight for at least 33 years, has been 35 pounds or more overweight for at least 33 years, has been 100 pounds or more overweight for 25 or more years and started dieting at age 14.      Previous diet attempts include: Cabbage Soup, Fasting and Slim Fast; None; Wellbutrin.  The most weight Ashley lost was 60 pounds on exercise but was only able to maintain that weight loss for 6 months.  Her maximum lifetime weight is 415 pounds.    As above, patient has been overweight for many years, with numerous failed dietary/weight loss attempts.  She now has obesity related comorbidities and as such has decided to pursue weight loss surgery. Purusing WLS to better her health and be able to do more things.  She knows someone who had surgery with Dr. Alves and is doing well.     GI history: occasional heartburn, takes tums/ rolaids PRN.  Has had EGD many years ago, unremarkable. S/p lap laney. No current GI complaints.     All past medical, surgical, social and family history have been obtained and discussed as pertinent to bariatric surgery as below.     Past Medical History:   Diagnosis Date   • Anxiety    • Boil     h/o multiple boils, has required I&D, unknown staph or MRSA   • Depression    • Dyspepsia    • Fatigue    • Heartburn     tums/ rolaids prn, EGD years ago- unremarkable.  Denies h/o h pylori.    • Hypertension    • Joint pain     knees, hips, shoulders, elbows, wrists.   Takes ibuprofen PRN, muscle relaxers. No injections.     • Lower extremity edema    • Migraines     ibuprofen or tylenol PRN.    • Morbid obesity with BMI of 60.0-69.9, adult    • JAY (obstructive sleep apnea)     does not use CPAP   • Palpitations     negative holter monitor   • Paresthesia of both hands    • Prediabetes     was on metformin, is off this.     • Seasonal allergies    • Shortness of breath     wtih exertion     Past Surgical History:   Procedure Laterality Date   • BREAST BIOPSY Right 2015    fibroadenoma   •  SECTION  ,     midline   • LAPAROSCOPIC CHOLECYSTECTOMY      cholelithiasis   • TONSILLECTOMY     • TUBAL ABDOMINAL LIGATION      with  section       No Known Allergies    Current Outpatient Prescriptions:   •  ibuprofen (ADVIL,MOTRIN) 200 MG tablet, Take 200 mg by mouth Every 6 (Six) Hours As Needed for Mild Pain ., Disp: , Rfl:   •  lisinopril-hydrochlorothiazide (PRINZIDE,ZESTORETIC) 10-12.5 MG per tablet, Take 1 tablet by mouth daily., Disp: , Rfl:   •  loratadine (CLARITIN) 10 MG tablet, Take 10 mg by mouth daily., Disp: , Rfl:   •  methocarbamol (ROBAXIN) 750 MG tablet, , Disp: , Rfl: 0  •  montelukast (SINGULAIR) 10 MG tablet, , Disp: , Rfl: 0  •  tiZANidine (ZANAFLEX) 4 MG tablet, Take 4 mg by mouth at night as needed for muscle spasms., Disp: , Rfl:   •  venlafaxine XR (EFFEXOR-XR) 75 MG 24 hr capsule, , Disp: , Rfl: 0  •  escitalopram (LEXAPRO) 10 MG tablet, Take 10 mg by mouth daily., Disp: , Rfl:   •  ondansetron ODT (ZOFRAN-ODT) 4 MG disintegrating tablet, Take 1 tablet by mouth Every 6 (Six) Hours As Needed for nausea or vomiting., Disp: 12 tablet, Rfl: 0    Social History     Social History   • Marital status:      Spouse name: N/A   • Number of children: 2   • Years of education: High School      Occupational History   • Not on file.     Social History Main Topics   • Smoking status: Never Smoker   • Smokeless tobacco: Never Used   • Alcohol use Yes      Comment: very little, 4oz in 6  months   • Drug use: No   • Sexual activity: Defer      Comment: no hormones     Other Topics Concern   • Not on file     Social History Narrative    [place] [facility] with mother.   Disabled from knee and joint pain.     Family History   Problem Relation Age of Onset   • Diabetes Mother    • Hypertension Mother    • Diabetes Father    • Hypertension Father    • Obesity Maternal Grandmother    • Diabetes Maternal Grandmother    • Hypertension Maternal Grandmother    • Heart attack Maternal Grandmother    • Diabetes Maternal Grandfather    • Heart attack Maternal Grandfather    • Obesity Paternal Grandmother    • Hypertension Paternal Grandmother    • Heart attack Paternal Grandmother    • Hypertension Paternal Grandfather    • Heart attack Paternal Grandfather    • Breast cancer Neg Hx        Review of Systems:  Constitutional:  Reports fatigue, weight gain and denies fevers, chills.  HEENT:  seasonal allergies  Cardiovascular:  Reports HTN, palpitations, edema and denies HLD, CAD, Atrial Fib, hx heart disease, heart murmur, hx MI, chest pain, hx DVT.  Respiratory:  Reports shortness of breath , sleep apnea and denies cough , wheezing, asthma, hx PE.  Gastrointestinal:  Reports heartburn, gallbladder issues and denies dysphagia, nausea, vomiting, abdominal pain, IBS, diarrhea, constipation, melena, blood in stool, liver disease, hx pancreatitis.  Genitourinary:  Reports none and denies history of  frequent UTI, incontinence, hematuria, dysuria, polyuria, polydipsia, renal insufficiency, renal failure.    Musculoskeletal:  Reports joint pain, back pain and denies fibromyalgia, arthritis and autoimmune disease.  Neurological:  Reports headaches, migraines and denies numbness /tingling, dizziness, confusion, seizure, stroke.  Psychiatric:  Reports anxiety, depression and denies bipolar disorder, hx suicide attempt, hx self injury, eating disorder.  Endocrine:  Reports glucose intolerance and denies  diabetes, thyroid disease, gout.  Hematologic:  Reports none and denies anemia, bleeding disorder, hx blood transfusion.  Skin:  Reports boils and denies hx MRSA.      Physical Exam:  Vital Signs:  Weight: (!) 179 kg (394 lb 0.2 oz)   Body mass index is 63.59 kg/m².  Temp: 97.6 °F (36.4 °C)   Heart Rate: 85   BP: 159/87     Physical Exam   Constitutional: She is oriented to person, place, and time. She appears well-developed and well-nourished.   Yawning repeatedly   HENT:   Head: Normocephalic and atraumatic.   Mouth/Throat: Oropharynx is clear and moist.   Eyes: EOM are normal.   Neck: Normal range of motion. Neck supple. No thyromegaly present.   Cardiovascular: Normal rate, regular rhythm and normal heart sounds.    Pulmonary/Chest: Effort normal and breath sounds normal. No respiratory distress. She has no wheezes.   Abdominal: Soft. Bowel sounds are normal. She exhibits no distension. There is no tenderness.   Lower midline scar. Lap scars.   Musculoskeletal: Normal range of motion. She exhibits edema.   Neurological: She is alert and oriented to person, place, and time.   Skin: Skin is warm and dry.   tattoo   Psychiatric: She has a normal mood and affect. Her behavior is normal. Judgment and thought content normal.   Vitals reviewed.      Patient Active Problem List   Diagnosis   • Anxiety   • JAY (obstructive sleep apnea)   • Hypertension   • Depression   • Seasonal allergies   • Joint pain   • Heartburn   • Lower extremity edema   • Shortness of breath   • Paresthesia of both hands   • Palpitations   • Migraines   • Prediabetes   • Boil   • Fatigue   • Morbid obesity with BMI of 60.0-69.9, adult   • Dyspepsia       Assessment:    Ashley Bartlett is a 42 y.o. year old female with medically complicated obesity pursuing sleeve gastrectomy.    Weight loss surgery is deemed medically necessary given the following obesity related comorbidities including sleep apnea, hypertension, back pain, knee pain, GERD, gall  bladder disease, edema and depression with current Weight: (!) 179 kg (394 lb 0.2 oz) and Body mass index is 63.59 kg/m²..    Plan:  The consultation plan and program requirements were reviewed with the patient.  The patient has been advised that a letter of medical support must be obtained from her primary care physician or referring provider. A psychological evaluation will be arranged.  A nutritional evaluation will be performed.  The patient was advised to start a high protein and low carbohydrate diet.  Necessary lifestyle modifications were discussed.  Instructions on how to access JIMENA was given to the patient.  JIMENA is an internet based educational video that explains the surgical procedure chosen and answers basic questions regarding that procedure.     Preoperative testing will include: CBC, CMP, Lipids, TSH, HgA1C, H.Pylori, CXR, EKG and EGD     Additional preop clearances required prior to surgery: Cardiac. Patient will schedule with home cardiologist.      The patient has been educated on expected postoperative lifestyle changes, including commitment to high protein diet, vitamin regimen, and exercise program.  They are aware that support groups are encouraged for optimal weight loss results. Patient understands that bariatric surgery is not cosmetic surgery but rather a tool to help make a lifelong commitment to lifestyle changes including diet, exercise, behavior modifications, and healthy habits. The procedure was discussed with the patient and all questions were answered. The importance of avoiding ASA/ NSAIDS/ steroids/ tobacco/ hormones/ immunomodulators perioperatively was discussed.         Ysabel Gilliland PA-C

## 2018-07-05 NOTE — ANESTHESIA POSTPROCEDURE EVALUATION
Patient: Ashley Bartlett    Procedure Summary     Date:  07/05/18 Room / Location:  [facility] ENDOSCOPY 1 /  [facility]    Anesthesia Start:  0935 Anesthesia Stop:      Procedure:  ESOPHAGOGASTRODUODENOSCOPY WITH BIOPSY Diagnosis:       Heartburn      (Heartburn [R12])    Surgeon:  Isaiah Alves MD Provider:  Jose Luis Clarke MD    Anesthesia Type:  general ASA Status:  3          Anesthesia Type: general  Last vitals  BP    123/65   Temp 97.0      Pulse    82   Resp      15   SpO2    100%     Post Anesthesia Care and Evaluation    Patient location during evaluation: PACU  Patient participation: complete - patient participated  Level of consciousness: awake and alert  Pain score: 0  Pain management: adequate  Airway patency: patent  Anesthetic complications: No anesthetic complications  PONV Status: none  Cardiovascular status: hemodynamically stable and acceptable  Respiratory status: nonlabored ventilation, acceptable and room air  Hydration status: acceptable

## 2018-07-05 NOTE — BRIEF OP NOTE
ESOPHAGOGASTRODUODENOSCOPY WITH BIOPSY  Progress Note    Ashley Bartlett  7/5/2018    Pre-op Diagnosis:   Heartburn [R12]       Post-Op Diagnosis Codes:     * Heartburn [R12]    Procedure/CPT® Codes:  [place] EGD TRANSORAL BIOPSY SINGLE/MULTIPLE [53371]    Procedure(s):  ESOPHAGOGASTRODUODENOSCOPY WITH BIOPSY    Surgeon(s):  Isaiah Alves MD    Anesthesia: * No anesthesia type entered *    Staff:   Circulator: Chelsey Valdes RN  Endo Nurse: Erin Mcarthur RN    Estimated Blood Loss: none    Urine Voided: * No values recorded between 7/5/2018  9:35 AM and 7/5/2018  9:41 AM *    Specimens:                ID Type Source Tests Collected by Time   A : Antrum bx Tissue Gastric, Antrum TISSUE PATHOLOGY EXAM Isaiah Alves MD 7/5/2018 0941   B : fundus bx Tissue Gastric, Fundus TISSUE PATHOLOGY EXAM Isaiah Alves MD 7/5/2018 0941   C : distal esophagus bx Tissue Esophagus, Distal TISSUE PATHOLOGY EXAM Isaiah Alves MD 7/5/2018 0941         Drains:      Findings:     Complications: none      Isaiah Alves MD     Date: 7/5/2018  Time: 9:43 AM

## 2018-07-06 LAB
CYTO UR: NORMAL
LAB AP CASE REPORT: NORMAL
LAB AP CLINICAL INFORMATION: NORMAL
PATH REPORT.FINAL DX SPEC: NORMAL
PATH REPORT.GROSS SPEC: NORMAL

## 2018-12-12 ENCOUNTER — APPOINTMENT (OUTPATIENT)
Dept: MAMMOGRAPHY | Facility: HOSPITAL | Age: 43
End: 2018-12-12

## 2019-01-11 ENCOUNTER — HOSPITAL ENCOUNTER (OUTPATIENT)
Dept: MAMMOGRAPHY | Facility: HOSPITAL | Age: 44
Discharge: HOME | End: 2019-01-11
Admitting: INTERNAL MEDICINE

## 2019-01-11 DIAGNOSIS — Z12.39 SCREENING BREAST EXAMINATION: ICD-10-CM

## 2019-01-11 PROCEDURE — 77067 SCR MAMMO BI INCL CAD: CPT | Performed by: RADIOLOGY

## 2019-01-11 PROCEDURE — 77063 BREAST TOMOSYNTHESIS BI: CPT | Performed by: RADIOLOGY

## 2019-01-11 PROCEDURE — 77063 BREAST TOMOSYNTHESIS BI: CPT

## 2019-01-11 PROCEDURE — 77067 SCR MAMMO BI INCL CAD: CPT

## 2019-04-11 ENCOUNTER — TRANSCRIBE ORDERS (OUTPATIENT)
Dept: ADMINISTRATIVE | Facility: HOSPITAL | Age: 44
End: 2019-04-11

## 2019-04-11 ENCOUNTER — HOSPITAL ENCOUNTER (OUTPATIENT)
Dept: GENERAL RADIOLOGY | Facility: HOSPITAL | Age: 44
Discharge: HOME | End: 2019-04-11
Admitting: INTERNAL MEDICINE

## 2019-04-11 DIAGNOSIS — M54.40 LOW BACK PAIN WITH SCIATICA, SCIATICA LATERALITY UNSPECIFIED, UNSPECIFIED BACK PAIN LATERALITY, UNSPECIFIED CHRONICITY: ICD-10-CM

## 2019-04-11 DIAGNOSIS — M54.40 LOW BACK PAIN WITH SCIATICA, SCIATICA LATERALITY UNSPECIFIED, UNSPECIFIED BACK PAIN LATERALITY, UNSPECIFIED CHRONICITY: Primary | ICD-10-CM

## 2019-04-11 PROCEDURE — 72110 X-RAY EXAM L-2 SPINE 4/>VWS: CPT

## 2019-04-11 PROCEDURE — 72110 X-RAY EXAM L-2 SPINE 4/>VWS: CPT | Performed by: RADIOLOGY

## 2019-04-15 ENCOUNTER — HOSPITAL ENCOUNTER (OUTPATIENT)
Dept: PHYSICAL THERAPY | Facility: HOSPITAL | Age: 44
Setting detail: THERAPIES SERIES
Discharge: HOME | End: 2019-04-15

## 2019-04-15 ENCOUNTER — TRANSCRIBE ORDERS (OUTPATIENT)
Dept: PHYSICAL THERAPY | Facility: HOSPITAL | Age: 44
End: 2019-04-15

## 2019-04-15 DIAGNOSIS — M54.41 CHRONIC BILATERAL LOW BACK PAIN WITH RIGHT-SIDED SCIATICA: Primary | ICD-10-CM

## 2019-04-15 DIAGNOSIS — M25.552 LEFT HIP PAIN: ICD-10-CM

## 2019-04-15 DIAGNOSIS — M25.551 PAIN OF BOTH HIP JOINTS: ICD-10-CM

## 2019-04-15 DIAGNOSIS — G89.29 CHRONIC BILATERAL LOW BACK PAIN WITH RIGHT-SIDED SCIATICA: Primary | ICD-10-CM

## 2019-04-15 DIAGNOSIS — M54.5 LOW BACK PAIN, UNSPECIFIED BACK PAIN LATERALITY, UNSPECIFIED CHRONICITY, WITH SCIATICA PRESENCE UNSPECIFIED: Primary | ICD-10-CM

## 2019-04-15 DIAGNOSIS — M25.552 PAIN OF BOTH HIP JOINTS: ICD-10-CM

## 2019-04-15 PROCEDURE — 97163 PT EVAL HIGH COMPLEX 45 MIN: CPT

## 2019-04-15 NOTE — THERAPY EVALUATION
Outpatient Physical Therapy Ortho Initial Evaluation   [facility]     Patient Name: Ashley Bartlett  : 1975  MRN: [id number]  Today's Date: 4/15/2019      Visit Date: 04/15/2019    Patient Active Problem List   Diagnosis   • Anxiety   • JAY (obstructive sleep apnea)   • Hypertension   • Depression   • Seasonal allergies   • Joint pain   • Heartburn   • Lower extremity edema   • Shortness of breath   • Paresthesia of both hands   • Palpitations   • Migraines   • Prediabetes   • Boil   • Fatigue   • Morbid obesity with BMI of 60.0-69.9, adult (CMS/[facility])   • Dyspepsia        Past Medical History:   Diagnosis Date   • Anxiety    • Boil     h/o multiple boils, has required I&D, unknown staph or MRSA   • Depression    • Dyspepsia    • Fatigue    • GERD (gastroesophageal reflux disease)    • Heartburn     tums/ rolaids prn, EGD years ago- unremarkable.  Denies h/o h pylori.    • Hypertension    • Joint pain     knees, hips, shoulders, elbows, wrists.   Takes ibuprofen PRN, muscle relaxers. No injections.    • Lower extremity edema    • Migraines     ibuprofen or tylenol PRN.    • Migraines    • Morbid obesity with BMI of 60.0-69.9, adult (CMS/[facility])    • JAY (obstructive sleep apnea)     does not use CPAP   • Palpitations     negative holter monitor/ cardiac workup. Has seen cardiologist a year ago.    • Paresthesia of both hands    • Prediabetes     was on metformin, better controlled and is off this now.    • Seasonal allergies    • Shortness of breath     wtih exertion        Past Surgical History:   Procedure Laterality Date   • BREAST BIOPSY Right 2015    fibroadenoma   •  SECTION  ,     midline   • ENDOSCOPY  2018    Procedure: ESOPHAGOGASTRODUODENOSCOPY WITH BIOPSY;  Surgeon: Isaiah Alves MD;  Location: [facility] ENDOSCOPY;  Service: Gastroenterology   • LAPAROSCOPIC CHOLECYSTECTOMY      cholelithiasis   • TONSILLECTOMY     • TUBAL ABDOMINAL LIGATION      with   section       Visit Dx:     ICD-10-CM ICD-9-CM   1. Chronic bilateral low back pain with right-sided sciatica M54.41 724.2    G89.29 724.3     338.29   2. Left hip pain M25.552 719.45         Patient History     Row Name 04/15/19 1400             History    Chief Complaint  Difficulty Walking;Balance Problems;Difficulty with daily activities;Fatigue/poor endurance;Joint stiffness;Muscle tenderness;Pain  -RT      Type of Pain  Back pain;Lower Extremity / Leg right leg and left hip  -RT      Date Current Problem(s) Began  -- 25 years  -RT      Brief Description of Current Complaint  Pt suffered a back injury at the age of 17 due an injury at work.  She has received treatment for her back for the past twenty years.  The patient was evaluated by MD Lindo two weeks ago and was advised to start therapy for treatment of current pain.  The patient has received an x-ray of the low back and may receive an MRI following therapy if indicated.  -RT      Patient/Caregiver Goals  Relieve pain;Improve mobility;Improve strength  -RT      Current Tobacco Use  no  -RT      Smoking Status  no  -RT      Patient's Rating of General Health  Fair  -RT      Hand Dominance  right-handed  -RT      Patient seeing anyone else for problem(s)?  no  -RT      How has patient tried to help current problem?  rest, heat, meds  -RT      What clinical tests have you had for this problem?  X-ray  -RT         Pain     Pain Location  Back right leg and left hip  -RT      Pain at Present  2  -RT      Pain at Best  0  -RT      Pain at Worst  10  -RT      Pain Frequency  Intermittent  -RT      Pain Description  Sharp;Stabbing  -RT      What Performance Factors Make the Current Problem(s) WORSE?  bending, lifting, twisting, standing  -RT      Tolerance Time- Standing  2min  -RT      Tolerance Time- Walking  2min  -RT         Fall Risk Assessment    Any falls in the past year:  Yes  -RT      Number of falls reported in the last 12 months  1  -RT      Factors  that contributed to the fall:  Lost balance  -RT         Daily Activities    Primary Language  English  -RT      Are you able to read  Yes  -RT      Are you able to write  Yes  -RT      How does patient learn best?  Listening;Reading;Demonstration;Pictures/Video  -RT         Safety    Are you being hurt, hit, or frightened by anyone at home or in your life?  No  -RT      Are you being neglected by a caregiver  No  -RT        User Key  (r) = Recorded By, (t) = Taken By, (c) = Cosigned By    Initials Name Provider Type    RT Delvin Santos, PT Physical Therapist          PT Ortho     Row Name 04/15/19 1400       Posture/Observations    Posture/Observations Comments  decreased stance on left; rounded posture  -RT       Neural Tension Signs- Lower Quarter Clearing    Slump  Bilateral:;Negative  -RT       Sensory Screen for Light Touch- Lower Quarter Clearing    L1 (inguinal area)  Bilateral:;Intact  -RT    L2 (anterior mid thigh)  Bilateral:;Intact  -RT    L3 (distal anterior thigh)  Bilateral:;Intact  -RT    L4 (medial lower leg/foot)  Bilateral:;Intact  -RT    L5 (lateral lower leg/great toe)  Bilateral:;Intact  -RT    S1 (bottom of foot)  Bilateral:;Intact  -RT       Myotomal Screen- Lower Quarter Clearing    Hip flexion (L2)  Bilateral:;4 (Good)  -RT    Knee extension (L3)  Bilateral:;4 (Good)  -RT    Ankle DF (L4)  Bilateral:;4 (Good)  -RT    Knee flexion (S2)  Bilateral:;4 (Good)  -RT       Lumbar ROM Screen- Lower Quarter Clearing    Lumbar Flexion  Impaired  -RT    Lumbar Extension  Impaired  -RT    Lumbar Rotation  Impaired  -RT       Lumbosacral Palpation    Lumbosacral Segment  Tender  -RT    Piriformis  Bilateral:;Tender  -RT    Erector Spinae (Paraspinals)  Bilateral:;Tender  -RT       Gait/Stairs Assessment/Training    Comment (Gait/Stairs)  amb with fwd flexed posture with decreased stance on left  -RT      User Key  (r) = Recorded By, (t) = Taken By, (c) = Cosigned By    Initials Name Provider Type     RT Delvin Santos, PT Physical Therapist                  [unfilled]    Therapy Education  Given: HEP, Symptoms/condition management, Pain management, Posture/body mechanics  Program: New  How Provided: Verbal, Demonstration  Provided to: Patient  Level of Understanding: Teach back education performed, Verbalized, Demonstrated     PT OP Goals     Row Name 04/15/19 1500          PT Short Term Goals    STG Date to Achieve  04/29/19  -RT     STG 1  Pt will be instructed in a HEP.  -RT     STG 1 Progress  New  -RT     STG 2  Pt will report pain no greater than 5/10.  -RT     STG 2 Progress  New  -RT     STG 3  Pt will improved her Mod Oswesry by 10%.  -RT     STG 3 Progress  New  -RT        Long Term Goals    LTG Date to Achieve  05/13/19  -RT     LTG 1  Pt will report pain no greater than 3/10 with ADls.  -RT     LTG 1 Progress  New  -RT     LTG 2  Pt will be able to stand for 10min without increased pain.  -RT     LTG 2 Progress  New  -RT     LTG 3  Pt will improve her LEFS to 50% or less.  -RT     LTG 3 Progress  New  -RT        Time Calculation    PT Goal Re-Cert Due Date  05/13/19  -RT       User Key  (r) = Recorded By, (t) = Taken By, (c) = Cosigned By    Initials Name Provider Type    RT Delvin Santos, PT Physical Therapist          PT Assessment/Plan     Row Name 04/15/19 1531          PT Assessment    Functional Limitations  Impaired gait;Performance in leisure activities;Performance in self-care ADL  -RT     Impairments  Balance;Gait;Muscle strength;Pain;Posture;Range of motion;Poor body mechanics  -RT     Assessment Comments  Pt is a 42 y/o female referred to therapy for treatment of back and left hip pain.  Pt presents with decreased core stability, decreased spinal mobility and increased pain.  Pt will benefit from therapy services for improved mobility.  -RT     Please refer to paper survey for additional self-reported information  Yes  -RT     Rehab Potential  Good  -RT     Patient/caregiver  participated in establishment of treatment plan and goals  Yes  -RT     Patient would benefit from skilled therapy intervention  Yes  -RT        PT Plan    PT Frequency  2x/week  -RT     Predicted Duration of Therapy Intervention (Therapy Eval)  4 weeks  -RT     Planned CPT's?  PT EVAL HIGH COMPLEXITY: 70158;PT RE-EVAL: 93378;PT THER PROC EA 15 MIN: 58232;PT THER ACT EA 15 MIN: 32101;PT MANUAL THERAPY EA 15 MIN: 46925;PT NEUROMUSC RE-EDUCATION EA 15 MIN: 67027;PT GAIT TRAINING EA 15 MIN: 78979;PT ELECTRICAL STIM UNATTEND: ;PT ULTRASOUND EA 15 MIN: 45011;PT HOT/COLD PACK WC NONMCARE: 37256  -RT     Physical Therapy Interventions (Optional Details)  neuromuscular re-education;modalities;manual therapy techniques;lumbar stabilization;joint mobilization;home exercise program;patient/family education;postural re-education;ROM (Range of Motion);strengthening;stretching  -RT     PT Plan Comments  Will follow for optimal gains.  -RT       User Key  (r) = Recorded By, (t) = Taken By, (c) = Cosigned By    Initials Name Provider Type    RT Delvin Santos, PT Physical Therapist                              Outcome Measure Options: Lower Extremity Functional Scale (LEFS), Amada Snow  Lower Extremity Functional Index  Any of your usual work, housework or school activities: Quite a bit of difficulty  Your usual hobbies, recreational or sporting activities: Quite a bit of difficulty  Getting into or out of the bath: A little bit of difficulty  Walking between rooms: A little bit of difficulty  Putting on your shoes or socks: A little bit of difficulty  Squatting: Moderate difficulty  Lifting an object, like a bag of groceries from the floor: Moderate difficulty  Performing light activities around your home: Moderate difficulty  Performing heavy activities around your home: Quite a bit of difficulty  Getting into or out of a car: A little bit of difficulty  Walking 2 blocks: Extreme difficulty or unable to perform  activity  Walking a mile: Extreme difficulty or unable to perform activity  Going up or down 10 stairs (about 1 flight of stairs): A little bit of difficulty  Standing for 1 hour: Extreme difficulty or unable to perform activity  Sitting for 1 hour: No difficulty  Running on even ground: Extreme difficulty or unable to perform activity  Running on uneven ground: Extreme difficulty or unable to perform activity  Making sharp turns while running fast: Extreme difficulty or unable to perform activity  Hopping: Extreme difficulty or unable to perform activity  Rolling over in bed: No difficulty  Total: 32  Modified Oswestry  Modified Oswestry Score/Comments: 56      Time Calculation:     Start Time: 1435  Stop Time: 1530  Time Calculation (min): 55 min     Therapy Charges for Today     Code Description Service Date Service Provider Modifiers Qty    23808331741 HC PT EVAL HIGH COMPLEXITY 4 4/15/2019 Delvin Santos, PT GP 1          PT G-Codes  Outcome Measure Options: Lower Extremity Functional Scale (LEFS), Amada Snow  Total: 32  Modified Oswestry Score/Comments: 56         Delvin Santos, PT  4/15/2019

## 2019-04-18 ENCOUNTER — HOSPITAL ENCOUNTER (OUTPATIENT)
Dept: PHYSICAL THERAPY | Facility: HOSPITAL | Age: 44
Setting detail: THERAPIES SERIES
Discharge: HOME | End: 2019-04-18

## 2019-04-18 DIAGNOSIS — M25.552 LEFT HIP PAIN: ICD-10-CM

## 2019-04-18 DIAGNOSIS — M54.41 CHRONIC BILATERAL LOW BACK PAIN WITH RIGHT-SIDED SCIATICA: Primary | ICD-10-CM

## 2019-04-18 DIAGNOSIS — G89.29 CHRONIC BILATERAL LOW BACK PAIN WITH RIGHT-SIDED SCIATICA: Primary | ICD-10-CM

## 2019-04-18 PROCEDURE — 97110 THERAPEUTIC EXERCISES: CPT

## 2019-04-18 PROCEDURE — G0283 ELEC STIM OTHER THAN WOUND: HCPCS

## 2019-04-18 NOTE — THERAPY TREATMENT NOTE
Outpatient Physical Therapy Ortho Treatment Note   [facility]     Patient Name: Ashley Bartlett  : 1975  MRN: [id number]  Today's Date: 2019       Visit Date: 2019    Visit Dx:    ICD-10-CM ICD-9-CM   1. Chronic bilateral low back pain with right-sided sciatica M54.41 724.2    G89.29 724.3     338.29   2. Left hip pain M25.552 719.45       Patient Active Problem List   Diagnosis   • Anxiety   • JAY (obstructive sleep apnea)   • Hypertension   • Depression   • Seasonal allergies   • Joint pain   • Heartburn   • Lower extremity edema   • Shortness of breath   • Paresthesia of both hands   • Palpitations   • Migraines   • Prediabetes   • Boil   • Fatigue   • Morbid obesity with BMI of 60.0-69.9, adult (CMS/[facility])   • Dyspepsia        Past Medical History:   Diagnosis Date   • Anxiety    • Boil     h/o multiple boils, has required I&D, unknown staph or MRSA   • Depression    • Dyspepsia    • Fatigue    • GERD (gastroesophageal reflux disease)    • Heartburn     tums/ rolaids prn, EGD years ago- unremarkable.  Denies h/o h pylori.    • Hypertension    • Joint pain     knees, hips, shoulders, elbows, wrists.   Takes ibuprofen PRN, muscle relaxers. No injections.    • Lower extremity edema    • Migraines     ibuprofen or tylenol PRN.    • Migraines    • Morbid obesity with BMI of 60.0-69.9, adult (CMS/[facility])    • JAY (obstructive sleep apnea)     does not use CPAP   • Palpitations     negative holter monitor/ cardiac workup. Has seen cardiologist a year ago.    • Paresthesia of both hands    • Prediabetes     was on metformin, better controlled and is off this now.    • Seasonal allergies    • Shortness of breath     wtih exertion        Past Surgical History:   Procedure Laterality Date   • BREAST BIOPSY Right 2015    fibroadenoma   •  SECTION  ,     midline   • ENDOSCOPY  2018    Procedure: ESOPHAGOGASTRODUODENOSCOPY WITH BIOPSY;  Surgeon: Isaiah Alves MD;  Location: [facility]  ENDOSCOPY;  Service: Gastroenterology   • LAPAROSCOPIC CHOLECYSTECTOMY      cholelithiasis   • TONSILLECTOMY     • TUBAL ABDOMINAL LIGATION      with  section       PT Ortho     Row Name 19 1400       Subjective Comments    Subjective Comments  Patient arrrives to therapy w/ reports of 5-6/10 low back and left hip pain.  Pt verbalizes compliance of initiating home program w/ no complaints.    -MARGUERITE      User Key  (r) = Recorded By, (t) = Taken By, (c) = Cosigned By    Initials Name Provider Type    Shanda Boyd PTA Physical Therapy Assistant                      PT Assessment/Plan     Row Name 19 9225          PT Assessment    Assessment Comments  Patient tolerated treatment well today w/ reports of decreased pain following, 4/10.  MH and Estim applied to low back region in seated position f/b therex as listed w/ focus on improved lumbar range of motion, lumbar stability and postural awareness.  Treatment concluded with cryotherapy.  Pt received cues as needed throughout session for improved mechanics and for max benefit w/ activities.  Pt will be progressed as tolerated.  No adverse reactions observed following modalities.    -MARGUERITE        PT Plan    PT Plan Comments  Continue with PT's POC and progress tx as tolerated by patient.   -MARGUERITE       User Key  (r) = Recorded By, (t) = Taken By, (c) = Cosigned By    Initials Name Provider Type    Shanda Boyd PTA Physical Therapy Assistant          Modalities     Row Name 19 1400             Moist Heat    MH Applied  Yes no redness observed following MH  -MARGUERITE      Location  low back  -MARGUERITE      Rx Minutes  15 mins  -MARGUERITE      MH Prior to Rx  Yes w/ estim in seated position  -MARGUERITE         ELECTRICAL STIMULATION    Attended/Unattended  Unattended no skin irritation observed following   -MARGUERITE      Stimulation Type  IFC  -MARGUERITE      Max mAmp  -- as to pt's tolerance  -MARGUERITE      Location/Electrode Placement/Other  lumbar   -MARGUERITE        PT E-Stim Unattended (Manual) Minutes  15  -MARGUERITE        User Key  (r) = Recorded By, (t) = Taken By, (c) = Cosigned By    Initials Name Provider Type    Shanda Boyd PTA Physical Therapy Assistant        Exercises     Row Name 04/18/19 1400             Subjective Comments    Subjective Comments  Patient arrrives to therapy w/ reports of 5-6/10 low back and left hip pain.  Pt verbalized compliance of initiating home program.    -MARGUERITE         Subjective Pain    Able to rate subjective pain?  yes  -MARGUERITE      Pre-Treatment Pain Level  6 5-6/10  -MARGUERITE      Post-Treatment Pain Level  4  -MARGUERITE         Total Minutes    77747 - PT Therapeutic Exercise Minutes  25  -MARGUERITE         Exercise 1    Exercise Name 1  LTR 10x2, GS 10x2, scap squeeze 10x2, ball squeeze 10x2, hip abd w/ tband (red) 10x2, PPT 10x2, supine clams 10x2  -MARGUERITE      Cueing 1  Verbal;Tactile;Demo  -MARGUERITE      Time 1  25  -MARGUERITE        User Key  (r) = Recorded By, (t) = Taken By, (c) = Cosigned By    Initials Name Provider Type    Shanda Boyd PTA Physical Therapy Assistant                           Therapy Education  Given: HEP, Symptoms/condition management, Pain management, Posture/body mechanics  Program: Reinforced  How Provided: Verbal, Demonstration  Provided to: Patient  Level of Understanding: Verbalized, Demonstrated, Teach back education performed              Time Calculation:   Start Time: 1355  Stop Time: 1444  Time Calculation (min): 49 min  Therapy Charges for Today     Code Description Service Date Service Provider Modifiers Qty    60236184231 HC PT THER PROC EA 15 MIN 4/18/2019 Shanda Frausto PTA GP 2    89984804645 HC PT ELECTRICAL STIM UNATTENDED 4/18/2019 Shanda Frausto PTA  1                    Shanda Alvarado. WES Frausto  4/18/2019

## 2019-04-22 ENCOUNTER — HOSPITAL ENCOUNTER (OUTPATIENT)
Dept: PHYSICAL THERAPY | Facility: HOSPITAL | Age: 44
Setting detail: THERAPIES SERIES
Discharge: HOME | End: 2019-04-22

## 2019-04-22 DIAGNOSIS — M54.41 CHRONIC BILATERAL LOW BACK PAIN WITH RIGHT-SIDED SCIATICA: Primary | ICD-10-CM

## 2019-04-22 DIAGNOSIS — G89.29 CHRONIC BILATERAL LOW BACK PAIN WITH RIGHT-SIDED SCIATICA: Primary | ICD-10-CM

## 2019-04-22 DIAGNOSIS — M25.552 LEFT HIP PAIN: ICD-10-CM

## 2019-04-22 PROCEDURE — G0283 ELEC STIM OTHER THAN WOUND: HCPCS

## 2019-04-22 PROCEDURE — 97110 THERAPEUTIC EXERCISES: CPT

## 2019-04-22 NOTE — THERAPY TREATMENT NOTE
Outpatient Physical Therapy Ortho Treatment Note   [facility]     Patient Name: Ashley Bartlett  : 1975  MRN: [id number]  Today's Date: 2019      Visit Date: 2019    Visit Dx:    ICD-10-CM ICD-9-CM   1. Chronic bilateral low back pain with right-sided sciatica M54.41 724.2    G89.29 724.3     338.29   2. Left hip pain M25.552 719.45       Patient Active Problem List   Diagnosis   • Anxiety   • JAY (obstructive sleep apnea)   • Hypertension   • Depression   • Seasonal allergies   • Joint pain   • Heartburn   • Lower extremity edema   • Shortness of breath   • Paresthesia of both hands   • Palpitations   • Migraines   • Prediabetes   • Boil   • Fatigue   • Morbid obesity with BMI of 60.0-69.9, adult (CMS/[facility])   • Dyspepsia        Past Medical History:   Diagnosis Date   • Anxiety    • Boil     h/o multiple boils, has required I&D, unknown staph or MRSA   • Depression    • Dyspepsia    • Fatigue    • GERD (gastroesophageal reflux disease)    • Heartburn     tums/ rolaids prn, EGD years ago- unremarkable.  Denies h/o h pylori.    • Hypertension    • Joint pain     knees, hips, shoulders, elbows, wrists.   Takes ibuprofen PRN, muscle relaxers. No injections.    • Lower extremity edema    • Migraines     ibuprofen or tylenol PRN.    • Migraines    • Morbid obesity with BMI of 60.0-69.9, adult (CMS/[facility])    • JAY (obstructive sleep apnea)     does not use CPAP   • Palpitations     negative holter monitor/ cardiac workup. Has seen cardiologist a year ago.    • Paresthesia of both hands    • Prediabetes     was on metformin, better controlled and is off this now.    • Seasonal allergies    • Shortness of breath     wtih exertion        Past Surgical History:   Procedure Laterality Date   • BREAST BIOPSY Right 2015    fibroadenoma   •  SECTION  ,     midline   • ENDOSCOPY  2018    Procedure: ESOPHAGOGASTRODUODENOSCOPY WITH BIOPSY;  Surgeon: Isaiah Alves MD;  Location: [facility]  ENDOSCOPY;  Service: Gastroenterology   • LAPAROSCOPIC CHOLECYSTECTOMY      cholelithiasis   • TONSILLECTOMY     • TUBAL ABDOMINAL LIGATION      with  section       PT Ortho     Row Name 19 1400       Subjective Comments    Subjective Comments  Patient states that she slept on her back the wrong way and she is having increased pain. Patient reports of pain on both sides of her low back.  -AC       Subjective Pain    Able to rate subjective pain?  yes  -AC    Pre-Treatment Pain Level  10  -AC    Post-Treatment Pain Level  8  -AC      User Key  (r) = Recorded By, (t) = Taken By, (c) = Cosigned By    Initials Name Provider Type    Cristy Jordan PTA Physical Therapy Assistant                      PT Assessment/Plan     Row Name 19 1442          PT Assessment    Assessment Comments  Secondary to patient's reports of increased pain, reps decreased on several exercises and exercises were performed slow due to patient's increased pain. Verbal cues needed on proper technique of exercises. Patient tolerated treatment session well with rest breaks taken as needed by the patient. Educated patient to perform ther-ex per her tolerance, patient verbalized understanding. NO adverse reactions with modalities or treatemnt session.   -AC        PT Plan    PT Plan Comments  Continue per PT's POC, progress per the patient's tolerance.  -AC       User Key  (r) = Recorded By, (t) = Taken By, (c) = Cosigned By    Initials Name Provider Type    Cristy Jordan PTA Physical Therapy Assistant          Modalities     Row Name 19 1400             Moist Heat    MH Applied  Yes NO redness noted following moist heat  -AC      Location  low back  -AC      Rx Minutes  15 mins  -AC      MH Prior to Rx  Yes  -AC         Ice    Ice Applied  Yes  -AC      Location  low back  -AC      Rx Minutes  Other: 8 minutes  -AC      Ice S/P Rx  Yes  -AC         ELECTRICAL STIMULATION     Attended/Unattended  Unattended No irritation noted following estim  -AC      Stimulation Type  IFC  -AC      Max mAmp  -- per the patient's tolerance, 15 minutes  -AC      Location/Electrode Placement/Other  lumbar   -AC       PT E-Stim Unattended (Manual) Minutes  15  -AC        User Key  (r) = Recorded By, (t) = Taken By, (c) = Cosigned By    Initials Name Provider Type     Cristy Oneal PTA Physical Therapy Assistant        Exercises     Row Name 04/22/19 1400             Subjective Comments    Subjective Comments  Patient states that she slept on her back the wrong way and she is having increased pain.  -AC         Subjective Pain    Able to rate subjective pain?  yes  -AC      Pre-Treatment Pain Level  10  -AC      Post-Treatment Pain Level  8  -AC         Total Minutes    24573 - PT Therapeutic Exercise Minutes  25  -AC         Exercise 1    Exercise Name 1  scap squeeze x15, LTR x15, PPT x15, ball squeeze x15, supine clams x15, GS x15, LAQ x10 each  -AC      Cueing 1  Verbal;Tactile;Demo  -AC      Time 1  25  -AC        User Key  (r) = Recorded By, (t) = Taken By, (c) = Cosigned By    Initials Name Provider Type     Cristy Oneal PTA Physical Therapy Assistant                           Therapy Education  Given: HEP, Symptoms/condition management, Pain management, Posture/body mechanics  Program: Reinforced  How Provided: Verbal, Demonstration  Provided to: Patient  Level of Understanding: Verbalized, Demonstrated              Time Calculation:   Start Time: 1350  Stop Time: 1440  Time Calculation (min): 50 min  Therapy Charges for Today     Code Description Service Date Service Provider Modifiers Qty    07459436217  PT THER PROC EA 15 MIN 4/22/2019 Cristy Oneal PTA GP 2    [id number]  PT ELECTRICAL STIM UNATTENDED 4/22/2019 Cristy Oneal PTA  1                    Cristy Oneal PTA  4/22/2019

## 2019-04-25 ENCOUNTER — HOSPITAL ENCOUNTER (OUTPATIENT)
Dept: PHYSICAL THERAPY | Facility: HOSPITAL | Age: 44
Setting detail: THERAPIES SERIES
Discharge: HOME | End: 2019-04-25

## 2019-04-25 DIAGNOSIS — M25.552 LEFT HIP PAIN: ICD-10-CM

## 2019-04-25 DIAGNOSIS — M54.41 CHRONIC BILATERAL LOW BACK PAIN WITH RIGHT-SIDED SCIATICA: Primary | ICD-10-CM

## 2019-04-25 DIAGNOSIS — G89.29 CHRONIC BILATERAL LOW BACK PAIN WITH RIGHT-SIDED SCIATICA: Primary | ICD-10-CM

## 2019-04-25 PROCEDURE — G0283 ELEC STIM OTHER THAN WOUND: HCPCS

## 2019-04-25 PROCEDURE — 97110 THERAPEUTIC EXERCISES: CPT

## 2019-04-25 NOTE — THERAPY TREATMENT NOTE
Outpatient Physical Therapy Ortho Treatment Note   [facility]     Patient Name: Ashley Bartlett  : 1975  MRN: [id number]  Today's Date: 2019      Visit Date: 2019    Visit Dx:    ICD-10-CM ICD-9-CM   1. Chronic bilateral low back pain with right-sided sciatica M54.41 724.2    G89.29 724.3     338.29   2. Left hip pain M25.552 719.45       Patient Active Problem List   Diagnosis   • Anxiety   • JAY (obstructive sleep apnea)   • Hypertension   • Depression   • Seasonal allergies   • Joint pain   • Heartburn   • Lower extremity edema   • Shortness of breath   • Paresthesia of both hands   • Palpitations   • Migraines   • Prediabetes   • Boil   • Fatigue   • Morbid obesity with BMI of 60.0-69.9, adult (CMS/[facility])   • Dyspepsia        Past Medical History:   Diagnosis Date   • Anxiety    • Boil     h/o multiple boils, has required I&D, unknown staph or MRSA   • Depression    • Dyspepsia    • Fatigue    • GERD (gastroesophageal reflux disease)    • Heartburn     tums/ rolaids prn, EGD years ago- unremarkable.  Denies h/o h pylori.    • Hypertension    • Joint pain     knees, hips, shoulders, elbows, wrists.   Takes ibuprofen PRN, muscle relaxers. No injections.    • Lower extremity edema    • Migraines     ibuprofen or tylenol PRN.    • Migraines    • Morbid obesity with BMI of 60.0-69.9, adult (CMS/[facility])    • JAY (obstructive sleep apnea)     does not use CPAP   • Palpitations     negative holter monitor/ cardiac workup. Has seen cardiologist a year ago.    • Paresthesia of both hands    • Prediabetes     was on metformin, better controlled and is off this now.    • Seasonal allergies    • Shortness of breath     wtih exertion        Past Surgical History:   Procedure Laterality Date   • BREAST BIOPSY Right 2015    fibroadenoma   •  SECTION  ,     midline   • ENDOSCOPY  2018    Procedure: ESOPHAGOGASTRODUODENOSCOPY WITH BIOPSY;  Surgeon: Isaiah Alves MD;  Location: [facility]  ENDOSCOPY;  Service: Gastroenterology   • LAPAROSCOPIC CHOLECYSTECTOMY      cholelithiasis   • TONSILLECTOMY     • TUBAL ABDOMINAL LIGATION      with  section       PT Ortho     Row Name 19 1500       Subjective Comments    Subjective Comments  Patient reports that she is having pain on both sides of her low back.  -AC       Subjective Pain    Able to rate subjective pain?  yes  -AC    Pre-Treatment Pain Level  5  -AC      User Key  (r) = Recorded By, (t) = Taken By, (c) = Cosigned By    Initials Name Provider Type    Cristy Jordan PTA Physical Therapy Assistant                      PT Assessment/Plan     Row Name 19 1512          PT Assessment    Assessment Comments  Patient tolerated treatment session well with rest breaks taken as needed by the patient. Educated patient to perform ther-ex per her tolerance, patient verbalized understanding. NO adverse reactions with modalities or treatment session. Reps increased on several exercises and exercises added back on treatment session due to patient's decreased pain.   -AC        PT Plan    PT Plan Comments  Continue per PT's POC, progress per the patient's tolerance.  -AC       User Key  (r) = Recorded By, (t) = Taken By, (c) = Cosigned By    Initials Name Provider Type    Cristy Jordan PTA Physical Therapy Assistant          Modalities     Row Name 19 1500             Moist Heat    MH Applied  Yes No redness noted following moist heat  -AC      Location  low back  -AC      Rx Minutes  15 mins  -AC      MH Prior to Rx  Yes  -AC         Ice    Ice Applied  Yes  -AC      Location  low back  -AC      Rx Minutes  Other: 8 minutes  -AC      Ice S/P Rx  Yes  -AC         ELECTRICAL STIMULATION    Attended/Unattended  Unattended No irritation noted following estim  -AC      Stimulation Type  IFC  -AC      Max mAmp  -- per the patient's tolerance, 15 minutes with MH  -AC      Location/Electrode Placement/Other   lumbar   -AC       PT E-Stim Unattended (Manual) Minutes  15  -AC        User Key  (r) = Recorded By, (t) = Taken By, (c) = Cosigned By    Initials Name Provider Type    Cristy Jordan PTA Physical Therapy Assistant        Exercises     Row Name 04/25/19 1500             Subjective Comments    Subjective Comments  Patient reports that she is having pain on both sides of her low back.  -AC         Subjective Pain    Able to rate subjective pain?  yes  -AC      Pre-Treatment Pain Level  5  -AC         Total Minutes    41201 - PT Therapeutic Exercise Minutes  30  -AC         Exercise 1    Exercise Name 1  scap squeeze 10Xx2, LTR 10x2, PPT 10x2, ball squeeze 10x2, supine clams 10x2, GS 10x2, LAQ 10x2 each, hip abd with RTB 10x2  -AC      Time 1  30 minutes  -AC        User Key  (r) = Recorded By, (t) = Taken By, (c) = Cosigned By    Initials Name Provider Type    Cristy Jordan PTA Physical Therapy Assistant                           Therapy Education  Given: HEP, Symptoms/condition management, Pain management, Posture/body mechanics  Program: Reinforced  How Provided: Verbal, Demonstration  Provided to: Patient  Level of Understanding: Verbalized, Demonstrated              Time Calculation:   Start Time: 1420  Stop Time: 1517  Time Calculation (min): 57 min  Therapy Charges for Today     Code Description Service Date Service Provider Modifiers Qty    72339252516 HC PT THER PROC EA 15 MIN 4/25/2019 Cristy Oneal PTA GP 2    [id number]  PT ELECTRICAL STIM UNATTENDED 4/25/2019 Cristy Oneal PTA  1                    Cristy Oneal PTA  4/25/2019

## 2019-05-02 ENCOUNTER — HOSPITAL ENCOUNTER (OUTPATIENT)
Dept: PHYSICAL THERAPY | Facility: HOSPITAL | Age: 44
Setting detail: THERAPIES SERIES
Discharge: HOME | End: 2019-05-02

## 2019-05-02 DIAGNOSIS — M54.41 CHRONIC BILATERAL LOW BACK PAIN WITH RIGHT-SIDED SCIATICA: Primary | ICD-10-CM

## 2019-05-02 DIAGNOSIS — M25.552 LEFT HIP PAIN: ICD-10-CM

## 2019-05-02 DIAGNOSIS — G89.29 CHRONIC BILATERAL LOW BACK PAIN WITH RIGHT-SIDED SCIATICA: Primary | ICD-10-CM

## 2019-05-02 PROCEDURE — 97110 THERAPEUTIC EXERCISES: CPT

## 2019-05-02 PROCEDURE — G0283 ELEC STIM OTHER THAN WOUND: HCPCS

## 2019-05-02 NOTE — THERAPY TREATMENT NOTE
Outpatient Physical Therapy Ortho Treatment Note   [facility]     Patient Name: Ashley Bartlett  : 1975  MRN: [id number]  Today's Date: 2019      Visit Date: 2019    Visit Dx:    ICD-10-CM ICD-9-CM   1. Chronic bilateral low back pain with right-sided sciatica M54.41 724.2    G89.29 724.3     338.29   2. Left hip pain M25.552 719.45       Patient Active Problem List   Diagnosis   • Anxiety   • JAY (obstructive sleep apnea)   • Hypertension   • Depression   • Seasonal allergies   • Joint pain   • Heartburn   • Lower extremity edema   • Shortness of breath   • Paresthesia of both hands   • Palpitations   • Migraines   • Prediabetes   • Boil   • Fatigue   • Morbid obesity with BMI of 60.0-69.9, adult (CMS/[facility])   • Dyspepsia        Past Medical History:   Diagnosis Date   • Anxiety    • Boil     h/o multiple boils, has required I&D, unknown staph or MRSA   • Depression    • Dyspepsia    • Fatigue    • GERD (gastroesophageal reflux disease)    • Heartburn     tums/ rolaids prn, EGD years ago- unremarkable.  Denies h/o h pylori.    • Hypertension    • Joint pain     knees, hips, shoulders, elbows, wrists.   Takes ibuprofen PRN, muscle relaxers. No injections.    • Lower extremity edema    • Migraines     ibuprofen or tylenol PRN.    • Migraines    • Morbid obesity with BMI of 60.0-69.9, adult (CMS/[facility])    • JAY (obstructive sleep apnea)     does not use CPAP   • Palpitations     negative holter monitor/ cardiac workup. Has seen cardiologist a year ago.    • Paresthesia of both hands    • Prediabetes     was on metformin, better controlled and is off this now.    • Seasonal allergies    • Shortness of breath     wtih exertion        Past Surgical History:   Procedure Laterality Date   • BREAST BIOPSY Right 2015    fibroadenoma   •  SECTION  ,     midline   • ENDOSCOPY  2018    Procedure: ESOPHAGOGASTRODUODENOSCOPY WITH BIOPSY;  Surgeon: Isaiah Alves MD;  Location: [facility]  ENDOSCOPY;  Service: Gastroenterology   • LAPAROSCOPIC CHOLECYSTECTOMY      cholelithiasis   • TONSILLECTOMY     • TUBAL ABDOMINAL LIGATION      with  section                       PT Assessment/Plan     Row Name 19 1511          PT Assessment    Assessment Comments  Patient completed today's session w/ reports of slight decrease in pain following, 5/10.  Pt received MH and Estim to low back in seated position f/b therex as listed.  Therex progressed with additional strengthening activities added to program.  Treatment concluded with cryotherapy.  Pt will be progressed as tolerated to address goals and acheive maximum level of function.  No adverse reactions observed following modalities.   -MARGUERITE        PT Plan    PT Plan Comments  Continue with PT's POC and will progress tx as tolerated by patient.   -MARGUERITE       User Key  (r) = Recorded By, (t) = Taken By, (c) = Cosigned By    Initials Name Provider Type    Shanda Boyd PTA Physical Therapy Assistant          Modalities     Row Name 19 1400             Subjective Pain    Post-Treatment Pain Level  5  -MARGUERITE         Moist Heat    MH Applied  Yes no redness observed following MH  -MARGUERITE      Location  low back  -MARGUERITE      Rx Minutes  15 mins  -MARGUERITE      MH Prior to Rx  Yes w/ estim in seated position  -MARGUERITE         Ice    Ice Applied  Yes  -MARGUERITE      Location  low back  -MARGUERITE      Rx Minutes  Other: 8 min  -MARGUERITE      Ice S/P Rx  Yes  -MARGUERITE         ELECTRICAL STIMULATION    Attended/Unattended  Unattended no skin irritation observed following estim  -MARGUERITE      Stimulation Type  IFC  -MARGUERITE      Max mAmp  -- as to pt's tolerance  -MARGUERITE      Location/Electrode Placement/Other  lumbar   -MARGUERITE       PT E-Stim Unattended (Manual) Minutes  15  -MARGUERITE        User Key  (r) = Recorded By, (t) = Taken By, (c) = Cosigned By    Initials Name Provider Type    Shanda Boyd PTA Physical Therapy Assistant        Exercises     Row Name 19 1400              Subjective Comments    Subjective Comments  Patient arrives to therapy w/ reports of 9/10 low back and L) hip pain.  Pt states her pain is possibly increased due to her sleeping position.    -MARGUERITE         Subjective Pain    Able to rate subjective pain?  yes  -MARGUERITE      Pre-Treatment Pain Level  9  -MARGUERITE      Post-Treatment Pain Level  5  -MARGUERITE         Total Minutes    18113 - PT Therapeutic Exercise Minutes  30  -MARGUERITE         Exercise 1    Exercise Name 1  LTR 10x2, scap squeeze 10x2, supine clams 10x2, PPT 15x2, supine ball squeeze 15x2, QS x10, hip abd w/ tband (red) 10x2, GS 15x2, SAQ 10x2, supine march 10x2  -MARGUERITE      Cueing 1  Verbal;Tactile;Demo  -MARGUERITE      Time 1  30 min  -MARGUERITE        User Key  (r) = Recorded By, (t) = Taken By, (c) = Cosigned By    Initials Name Provider Type    Shanda Boyd PTA Physical Therapy Assistant                           Therapy Education  Given: HEP, Symptoms/condition management, Pain management, Posture/body mechanics  Program: Reinforced  How Provided: Verbal, Demonstration  Provided to: Patient  Level of Understanding: Verbalized, Demonstrated              Time Calculation:   Start Time: 1400  Stop Time: 1455  Time Calculation (min): 55 min  Therapy Charges for Today     Code Description Service Date Service Provider Modifiers Qty    28638169725 HC PT THER PROC EA 15 MIN 5/2/2019 Shanda Frausto PTA GP 2    13578768894 HC PT ELECTRICAL STIM UNATTENDED 5/2/2019 Shanda Frausto PTA  1                    Shanda Alvarado. WES Frausto  5/2/2019

## 2019-05-09 ENCOUNTER — HOSPITAL ENCOUNTER (OUTPATIENT)
Dept: PHYSICAL THERAPY | Facility: HOSPITAL | Age: 44
Setting detail: THERAPIES SERIES
Discharge: HOME | End: 2019-05-09

## 2019-05-09 DIAGNOSIS — M54.41 CHRONIC BILATERAL LOW BACK PAIN WITH RIGHT-SIDED SCIATICA: Primary | ICD-10-CM

## 2019-05-09 DIAGNOSIS — G89.29 CHRONIC BILATERAL LOW BACK PAIN WITH RIGHT-SIDED SCIATICA: Primary | ICD-10-CM

## 2019-05-09 DIAGNOSIS — M25.552 LEFT HIP PAIN: ICD-10-CM

## 2019-05-09 PROCEDURE — G0283 ELEC STIM OTHER THAN WOUND: HCPCS | Performed by: PHYSICAL THERAPIST

## 2019-05-09 PROCEDURE — 97110 THERAPEUTIC EXERCISES: CPT | Performed by: PHYSICAL THERAPIST

## 2019-05-09 NOTE — THERAPY TREATMENT NOTE
Outpatient Physical Therapy Ortho Treatment Note   [facility]     Patient Name: Ashley Bartlett  : 1975  MRN: [id number]  Today's Date: 2019      Visit Date: 2019    Visit Dx:    ICD-10-CM ICD-9-CM   1. Chronic bilateral low back pain with right-sided sciatica M54.41 724.2    G89.29 724.3     338.29   2. Left hip pain M25.552 719.45       Patient Active Problem List   Diagnosis   • Anxiety   • JAY (obstructive sleep apnea)   • Hypertension   • Depression   • Seasonal allergies   • Joint pain   • Heartburn   • Lower extremity edema   • Shortness of breath   • Paresthesia of both hands   • Palpitations   • Migraines   • Prediabetes   • Boil   • Fatigue   • Morbid obesity with BMI of 60.0-69.9, adult (CMS/[facility])   • Dyspepsia        Past Medical History:   Diagnosis Date   • Anxiety    • Boil     h/o multiple boils, has required I&D, unknown staph or MRSA   • Depression    • Dyspepsia    • Fatigue    • GERD (gastroesophageal reflux disease)    • Heartburn     tums/ rolaids prn, EGD years ago- unremarkable.  Denies h/o h pylori.    • Hypertension    • Joint pain     knees, hips, shoulders, elbows, wrists.   Takes ibuprofen PRN, muscle relaxers. No injections.    • Lower extremity edema    • Migraines     ibuprofen or tylenol PRN.    • Migraines    • Morbid obesity with BMI of 60.0-69.9, adult (CMS/[facility])    • JAY (obstructive sleep apnea)     does not use CPAP   • Palpitations     negative holter monitor/ cardiac workup. Has seen cardiologist a year ago.    • Paresthesia of both hands    • Prediabetes     was on metformin, better controlled and is off this now.    • Seasonal allergies    • Shortness of breath     wtih exertion        Past Surgical History:   Procedure Laterality Date   • BREAST BIOPSY Right 2015    fibroadenoma   •  SECTION  ,     midline   • ENDOSCOPY  2018    Procedure: ESOPHAGOGASTRODUODENOSCOPY WITH BIOPSY;  Surgeon: Isaiah Alves MD;  Location: [facility]  ENDOSCOPY;  Service: Gastroenterology   • LAPAROSCOPIC CHOLECYSTECTOMY      cholelithiasis   • TONSILLECTOMY     • TUBAL ABDOMINAL LIGATION      with  section       PT Ortho     Row Name 19 1400       Subjective Comments    Subjective Comments  Patient reports that she has 3/10 pain today.  She notes that she slept well last night.  -BE       Subjective Pain    Able to rate subjective pain?  yes  -BE    Pre-Treatment Pain Level  3  -BE    Post-Treatment Pain Level  3  -BE      User Key  (r) = Recorded By, (t) = Taken By, (c) = Cosigned By    Initials Name Provider Type    Chrissy Wagner, PT Physical Therapist                      PT Assessment/Plan     Row Name 19 0696          PT Assessment    Assessment Comments  Patient tolerated today's session well, with no reports of increased pain.  Session consisted of MH, ESTIM, ther ex, adn cryotherapy.  No adverse reactions were noted with modalities.  Ther ex progressed to include increased repetitions.  Patient will continue to be progressed per her tolerance and POC.  -BE        PT Plan    PT Plan Comments  Progress per patient's tolerance and POC.  -BE       User Key  (r) = Recorded By, (t) = Taken By, (c) = Cosigned By    Initials Name Provider Type    BE Chrissy Barnard, PT Physical Therapist          Modalities     Row Name 19 1300             Moist Heat    MH Applied  Yes no redness following MH  -BE      Location  low back  -BE      Rx Minutes  15 mins  -BE      MH Prior to Rx  Yes w/ estim in seated position  -BE         Ice    Ice Applied  Yes  -BE      Location  low back  -BE      Rx Minutes  Other: 8 min  -BE      Ice S/P Rx  Yes  -BE         ELECTRICAL STIMULATION    Attended/Unattended  Unattended no skin irritation observed following estim  -BE      Stimulation Type  IFC  -BE      Max mAmp  -- as to pt's tolerance  -BE      Location/Electrode Placement/Other  lumbar   -BE       PT E-Stim Unattended  (Manual) Minutes  15  -BE        User Key  (r) = Recorded By, (t) = Taken By, (c) = Cosigned By    Initials Name Provider Type    BE Chrissy Barnard PT Physical Therapist        Exercises     Row Name 05/09/19 1400             Subjective Comments    Subjective Comments  Patient reports that she has 3/10 pain today.  She notes that she slept well last night.  -BE         Subjective Pain    Able to rate subjective pain?  yes  -BE      Pre-Treatment Pain Level  3  -BE      Post-Treatment Pain Level  3  -BE         Total Minutes    75594 - PT Therapeutic Exercise Minutes  25  -BE         Exercise 1    Exercise Name 1  LTR 2x15, Scap squeeze 2x10, Supine clams 2x15, PPT 2x15, Supine ball squeeze 2x15, Supine march 2x10, GS 2x15, SAQ 2x10, Hip abd with RTB 2x10  -BE      Cueing 1  Verbal;Tactile;Demo  -BE      Time 1  25 min  -BE        User Key  (r) = Recorded By, (t) = Taken By, (c) = Cosigned By    Initials Name Provider Type    BE Chrissy Barnard PT Physical Therapist                           Therapy Education  Given: HEP, Symptoms/condition management, Pain management, Posture/body mechanics  Program: Reinforced, Progressed  How Provided: Verbal, Demonstration  Provided to: Patient  Level of Understanding: Verbalized, Demonstrated              Time Calculation:   Start Time: 1400  Stop Time: 1451  Time Calculation (min): 51 min  Therapy Charges for Today     Code Description Service Date Service Provider Modifiers Qty    37449130442  PT THER PROC EA 15 MIN 5/9/2019 Chrissy Barnard PT GP 2    [id number]  PT ELECTRICAL STIM UNATTENDED 5/9/2019 Chrissy Barnard PT  1                    Chrissy Barnard PT  5/9/2019

## 2019-05-13 ENCOUNTER — HOSPITAL ENCOUNTER (OUTPATIENT)
Dept: PHYSICAL THERAPY | Facility: HOSPITAL | Age: 44
Setting detail: THERAPIES SERIES
Discharge: HOME | End: 2019-05-13

## 2019-05-13 PROCEDURE — 97110 THERAPEUTIC EXERCISES: CPT | Performed by: PHYSICAL THERAPIST

## 2019-05-13 PROCEDURE — G0283 ELEC STIM OTHER THAN WOUND: HCPCS | Performed by: PHYSICAL THERAPIST

## 2019-05-13 PROCEDURE — 97140 MANUAL THERAPY 1/> REGIONS: CPT | Performed by: PHYSICAL THERAPIST

## 2019-05-13 NOTE — THERAPY RE-EVALUATION
Outpatient Physical Therapy Ortho Re-Assessment   [facility]     Patient Name: Ashley Bartlett  : 1975  MRN: [id number]  Today's Date: 2019      Visit Date: 2019    Patient Active Problem List   Diagnosis   • Anxiety   • JAY (obstructive sleep apnea)   • Hypertension   • Depression   • Seasonal allergies   • Joint pain   • Heartburn   • Lower extremity edema   • Shortness of breath   • Paresthesia of both hands   • Palpitations   • Migraines   • Prediabetes   • Boil   • Fatigue   • Morbid obesity with BMI of 60.0-69.9, adult (CMS/[facility])   • Dyspepsia        Past Medical History:   Diagnosis Date   • Anxiety    • Boil     h/o multiple boils, has required I&D, unknown staph or MRSA   • Depression    • Dyspepsia    • Fatigue    • GERD (gastroesophageal reflux disease)    • Heartburn     tums/ rolaids prn, EGD years ago- unremarkable.  Denies h/o h pylori.    • Hypertension    • Joint pain     knees, hips, shoulders, elbows, wrists.   Takes ibuprofen PRN, muscle relaxers. No injections.    • Lower extremity edema    • Migraines     ibuprofen or tylenol PRN.    • Migraines    • Morbid obesity with BMI of 60.0-69.9, adult (CMS/[facility])    • JAY (obstructive sleep apnea)     does not use CPAP   • Palpitations     negative holter monitor/ cardiac workup. Has seen cardiologist a year ago.    • Paresthesia of both hands    • Prediabetes     was on metformin, better controlled and is off this now.    • Seasonal allergies    • Shortness of breath     wtih exertion        Past Surgical History:   Procedure Laterality Date   • BREAST BIOPSY Right 2015    fibroadenoma   •  SECTION  ,     midline   • ENDOSCOPY  2018    Procedure: ESOPHAGOGASTRODUODENOSCOPY WITH BIOPSY;  Surgeon: Isaiah Alves MD;  Location: [facility] ENDOSCOPY;  Service: Gastroenterology   • LAPAROSCOPIC CHOLECYSTECTOMY      cholelithiasis   • TONSILLECTOMY     • TUBAL ABDOMINAL LIGATION      with  section        Visit Dx:   No diagnosis found.                          Therapy Education  Given: HEP  Program: Reinforced  How Provided: Verbal  Provided to: Patient  Level of Understanding: Verbalized     PT OP Goals     Row Name 05/13/19 1500          PT Short Term Goals    STG Date to Achieve  05/27/19  -CC     STG 1  Pt will be instructed in a HEP.  -CC     STG 1 Progress  Met  -CC     STG 1 Progress Comments  Pt reports she has been instructed with her HEP, states she performs when she can at home.   -CC     STG 2  Pt will report pain no greater than 5/10.  -CC     STG 2 Progress  Met  -CC     STG 2 Progress Comments  Pt reports her pain level is a 5/10 initially today, by conclusion of treatment pt reports her pain level is 3-4/10.    -CC     STG 3  Pt will improved her Mod Oswesry by 10%.  -CC     STG 3 Progress  Ongoing  -CC     STG 3 Progress Comments  no change with progress towards goal,  pt's score remain same as on IE,  score of 56  -CC        Long Term Goals    LTG Date to Achieve  06/12/19  -CC     LTG 1  Pt will report pain no greater than 3/10 with ADls.  -CC     LTG 1 Progress  Ongoing  -CC     LTG 1 Progress Comments  see related STG  -CC     LTG 2  Pt will be able to stand for 10min without increased pain.  -CC     LTG 2 Progress  Ongoing  -CC     LTG 2 Progress Comments  pt reports no change, towards progress of goal,  states is she has something to prop onto when standing it does help,  reports putting a load of laundry in the dryer is very painful for her to do   -CC     LTG 3  Pt will improve her LEFS to 50% or less.  -CC     LTG 3 Progress  Ongoing  -CC     LTG 3 Progress Comments  no progress towards goal achieved, but score decrease ro 28 from 32.    -CC        Time Calculation    PT Goal Re-Cert Due Date  06/12/19  -CC       User Key  (r) = Recorded By, (t) = Taken By, (c) = Cosigned By    Initials Name Provider Type    Harriet Gaona, PT Physical Therapist          PT Assessment/Plan      Row Name 05/13/19 1530          PT Assessment    Impairments  Balance;Gait;Muscle strength;Pain;Posture;Range of motion;Poor body mechanics  -CC     Assessment Comments  Pt presents as a 42 y/o female, who has been receiving skilled PT services for diagonsis of LBP and L) hip pain.  Pt has achieved 2/3 STGs with decreasing c/o pain to 5/10 and instruction and performance of HEP.  Pt continues to present with c/o pain and decrease tolerance with standing for 10 mins, difficulty with performing household activities such as doing a load of laundry.  Pt's performance of questionaire's with Modified Oswestry LBP score was no change from IE and the Lower Extermity Functional Scale (LEFS) score decreased from 32 to 28 indicating decrease functional mobility.  Pt will continue to require skilled PT services to focus on decreasing c/o pain with functional/daily activities, increasing performance with daily activities such as walking, standing, to promote improved functional mobility.   -CC     Rehab Potential  Good  -CC     Patient/caregiver participated in establishment of treatment plan and goals  Yes  -CC     Patient would benefit from skilled therapy intervention  Yes  -CC        PT Plan    PT Frequency  2x/week  -CC     Predicted Duration of Therapy Intervention (Therapy Eval)  1 month  -CC     Planned CPT's?  PT RE-EVAL: 74969;PT THER PROC EA 15 MIN: 95527;PT MANUAL THERAPY EA 15 MIN: 11453;PT NEUROMUSC RE-EDUCATION EA 15 MIN: 46944;PT ELECTRICAL STIM ATTD EA 15 MIN: 38628;PT HOT/COLD PACK WC NONMCARE: 61994;PT ULTRASOUND EA 15 MIN: 35294;PT ELECTRICAL STIM UNATTEND:   -CC     Physical Therapy Interventions (Optional Details)  strengthening;stretching;swiss ball techniques;patient/family education;postural re-education;transfer training;taping;manual therapy techniques;gross motor skills;modalities;home exercise program;motor coordination training;neuromuscular re-education;ROM (Range of Motion)  -CC     PT Plan  Comments  continue with POC   -CC       User Key  (r) = Recorded By, (t) = Taken By, (c) = Cosigned By    Initials Name Provider Type    Harriet Gaona, PT Physical Therapist          Modalities     Row Name 05/13/19 1500             Moist Heat    MH Applied  Yes no observed adverse skin reactions to MH  -CC      Location  low back  -CC      Rx Minutes  15 mins  -CC      MH Prior to Rx  Yes w/ estim in seated position  -CC         Ice    Ice Applied  Yes  -CC      Location  low back  -CC      Rx Minutes  Other: 10 mins  -CC      Ice S/P Rx  Yes  -CC         ELECTRICAL STIMULATION    Attended/Unattended  Unattended no skin irritation observed following estim  -CC      Stimulation Type  IFC with MH, seated  -CC      Max mAmp  -- intensity as to pt's tolerance, IFC per unit protocol settin  -CC      Location/Electrode Placement/Other  lumbar   -CC       PT E-Stim Unattended (Manual) Minutes  15  -CC        User Key  (r) = Recorded By, (t) = Taken By, (c) = Cosigned By    Initials Name Provider Type    Harriet Gaona, PT Physical Therapist        Exercises     Row Name 05/13/19 1500             Subjective Comments    Subjective Comments  Pt reports no new concerns today, but initially reports a  pain level of 5/10.  Pt reports with therapy she is able to have some good days and then continues to have some bad days.   -CC         Subjective Pain    Able to rate subjective pain?  yes  -CC      Pre-Treatment Pain Level  5  -CC      Post-Treatment Pain Level  3 pt reports 3-4/10  -CC         Total Minutes    44851 - PT Therapeutic Exercise Minutes  30  -CC      61325 - PT Manual Therapy Minutes  8  -CC         Exercise 1    Exercise Name 1  LTR 15 x 2, PPT 15 x 2, supine ball squeeze 15 x 2, supine march 15 x 2, SAQ 15 x 2, Hip abduction (sitting) GTB 15 x 2, GS 15 x 2, Shoulder blade pinch 15 x 2  -CC      Cueing 1  Verbal;Tactile  -CC      Time 1  30 mins  -CC        User Key  (r) = Recorded By, (t) = Taken  By, (c) = Cosigned By    Initials Name Provider Type    Harriet Gaona, PT Physical Therapist           Manual Rx (last 36 hours)      Manual Treatments     Row Name 05/13/19 1500             Total Minutes    10614 - PT Manual Therapy Minutes  8  -CC         Manual Rx 1    Manual Rx 1 Location  lumbar  -CC      Manual Rx 1 Type  STM  -CC      Manual Rx 1 Grade  gentle, as to pt's   -CC      Manual Rx 1 Duration  8  -CC        User Key  (r) = Recorded By, (t) = Taken By, (c) = Cosigned By    Initials Name Provider Type    Harriet Gaona, PT Physical Therapist                      Outcome Measure Options: Lower Extremity Functional Scale (LEFS)  Lower Extremity Functional Index  Any of your usual work, housework or school activities: Moderate difficulty  Your usual hobbies, recreational or sporting activities: Quite a bit of difficulty  Getting into or out of the bath: A little bit of difficulty  Walking between rooms: A little bit of difficulty  Putting on your shoes or socks: A little bit of difficulty  Squatting: Extreme difficulty or unable to perform activity  Lifting an object, like a bag of groceries from the floor: Moderate difficulty  Performing light activities around your home: Moderate difficulty  Performing heavy activities around your home: Quite a bit of difficulty  Getting into or out of a car: A little bit of difficulty  Walking 2 blocks: Extreme difficulty or unable to perform activity  Walking a mile: Extreme difficulty or unable to perform activity  Going up or down 10 stairs (about 1 flight of stairs): Moderate difficulty  Standing for 1 hour: Extreme difficulty or unable to perform activity  Sitting for 1 hour: A little bit of difficulty  Running on even ground: Extreme difficulty or unable to perform activity  Running on uneven ground: Extreme difficulty or unable to perform activity  Making sharp turns while running fast: Extreme difficulty or unable to perform activity  Hopping:  Extreme difficulty or unable to perform activity  Rolling over in bed: A little bit of difficulty  Total: 28  Modified Oswestry  Modified Oswestry Score/Comments: 56      Time Calculation:     Start Time: 1410  Stop Time: 1515  Time Calculation (min): 65 min     Therapy Charges for Today     Code Description Service Date Service Provider Modifiers Qty    08659994258  PT THER PROC EA 15 MIN 5/13/2019 Harriet Kearns, PT GP 2    [id number]  PT MANUAL THERAPY EA 15 MIN 5/13/2019 Harriet Kearns, PT GP 1    [id number]  PT ELECTRICAL STIM UNATTENDED 5/13/2019 Harriet Kearns, PT  1          PT G-Codes  Outcome Measure Options: Lower Extremity Functional Scale (LEFS)  Total: 28  Modified Oswestry Score/Comments: 56         Harriet Kearns, PT  5/13/2019

## 2019-05-16 ENCOUNTER — HOSPITAL ENCOUNTER (OUTPATIENT)
Dept: PHYSICAL THERAPY | Facility: HOSPITAL | Age: 44
Setting detail: THERAPIES SERIES
Discharge: HOME | End: 2019-05-16

## 2019-05-16 DIAGNOSIS — M54.41 CHRONIC BILATERAL LOW BACK PAIN WITH RIGHT-SIDED SCIATICA: Primary | ICD-10-CM

## 2019-05-16 DIAGNOSIS — M25.552 LEFT HIP PAIN: ICD-10-CM

## 2019-05-16 DIAGNOSIS — G89.29 CHRONIC BILATERAL LOW BACK PAIN WITH RIGHT-SIDED SCIATICA: Primary | ICD-10-CM

## 2019-05-16 PROCEDURE — 97110 THERAPEUTIC EXERCISES: CPT

## 2019-05-16 PROCEDURE — G0283 ELEC STIM OTHER THAN WOUND: HCPCS

## 2019-05-16 NOTE — THERAPY TREATMENT NOTE
Outpatient Physical Therapy Ortho Treatment Note   [facility]     Patient Name: Ashley Bartlett  : 1975  MRN: [id number]  Today's Date: 2019      Visit Date: 2019    Visit Dx:    ICD-10-CM ICD-9-CM   1. Chronic bilateral low back pain with right-sided sciatica M54.41 724.2    G89.29 724.3     338.29   2. Left hip pain M25.552 719.45       Patient Active Problem List   Diagnosis   • Anxiety   • JAY (obstructive sleep apnea)   • Hypertension   • Depression   • Seasonal allergies   • Joint pain   • Heartburn   • Lower extremity edema   • Shortness of breath   • Paresthesia of both hands   • Palpitations   • Migraines   • Prediabetes   • Boil   • Fatigue   • Morbid obesity with BMI of 60.0-69.9, adult (CMS/[facility])   • Dyspepsia        Past Medical History:   Diagnosis Date   • Anxiety    • Boil     h/o multiple boils, has required I&D, unknown staph or MRSA   • Depression    • Dyspepsia    • Fatigue    • GERD (gastroesophageal reflux disease)    • Heartburn     tums/ rolaids prn, EGD years ago- unremarkable.  Denies h/o h pylori.    • Hypertension    • Joint pain     knees, hips, shoulders, elbows, wrists.   Takes ibuprofen PRN, muscle relaxers. No injections.    • Lower extremity edema    • Migraines     ibuprofen or tylenol PRN.    • Migraines    • Morbid obesity with BMI of 60.0-69.9, adult (CMS/[facility])    • JAY (obstructive sleep apnea)     does not use CPAP   • Palpitations     negative holter monitor/ cardiac workup. Has seen cardiologist a year ago.    • Paresthesia of both hands    • Prediabetes     was on metformin, better controlled and is off this now.    • Seasonal allergies    • Shortness of breath     wtih exertion        Past Surgical History:   Procedure Laterality Date   • BREAST BIOPSY Right 2015    fibroadenoma   •  SECTION  ,     midline   • ENDOSCOPY  2018    Procedure: ESOPHAGOGASTRODUODENOSCOPY WITH BIOPSY;  Surgeon: Isaiah Alves MD;  Location: [facility]  ENDOSCOPY;  Service: Gastroenterology   • LAPAROSCOPIC CHOLECYSTECTOMY      cholelithiasis   • TONSILLECTOMY     • TUBAL ABDOMINAL LIGATION      with  section       PT Ortho     Row Name 19 1600       Subjective Pain    Able to rate subjective pain?  yes  -MARGUERITE    Pre-Treatment Pain Level  5  -MARGUERITE      User Key  (r) = Recorded By, (t) = Taken By, (c) = Cosigned By    Initials Name Provider Type    Shanda Boyd PTA Physical Therapy Assistant                      PT Assessment/Plan     Row Name 19 1626          PT Assessment    Assessment Comments  Patient responded well to today's session w/ reports of decreased pain following, 3/10.  Pt received MH and Estim to low back in seated position f/b therex as listed w/ focus on improved lumbar range of motion, lumbar stability, and LE strength.  Pt required intermittent cues throughout session for improved feedback and for max benefit w/ activities.  Treatment concluded with cryotherapy.  Pt limited w/ progression of activities secondary to bilateral knee pain.  Pt will be progressed as tolerated.  No adverse reactions observed following modalities.   -MARGUERITE        PT Plan    PT Plan Comments  Continue with PT's POC and progress tx as tolerated by patient.   -MARGUERITE       User Key  (r) = Recorded By, (t) = Taken By, (c) = Cosigned By    Initials Name Provider Type    Shanda Boyd PTA Physical Therapy Assistant          Modalities     Row Name 19 1600             Moist Heat    MH Applied  Yes no redness observed following MH  -MARGUERITE      Location  low back  -MARGUERITE      Rx Minutes  15 mins  -MARGUERITE      MH Prior to Rx  Yes w/ estim in seated position  -MARGUERITE         Ice    Ice Applied  Yes  -MARGUERITE      Location  low back  -MARGUERITE      Rx Minutes  10 mins  -MARGUERITE      Ice S/P Rx  Yes  -MARGUERITE         ELECTRICAL STIMULATION    Attended/Unattended  Unattended no skin irritation observed following estim  -MARGUERITE      Stimulation Type  Pre-Mod  -MARGUERITE       Max mAmp  -- as to pt's tolerance  -MARGUERITE      Location/Electrode Placement/Other  lumbar   -MARGUERITE       PT E-Stim Unattended (Manual) Minutes  15  -MARGUERITE        User Key  (r) = Recorded By, (t) = Taken By, (c) = Cosigned By    Initials Name Provider Type    Shanda Boyd PTA Physical Therapy Assistant        Exercises     Row Name 05/16/19 1600             Subjective Comments    Subjective Comments  Patient arrives to therapy w/ reports of 5/10 low back and bilateral hip pain.  Pt states she was sore following the massage in previous session, and wishes to hold today.  Otherwise pt states of no complaints/ changes.   -MARGUERITE         Subjective Pain    Able to rate subjective pain?  yes  -MARGUERITE      Pre-Treatment Pain Level  5  -MARGUERITE      Post-Treatment Pain Level  3  -MARGUERITE         Total Minutes    82776 - PT Therapeutic Exercise Minutes  25  -MARGUERITE         Exercise 1    Exercise Name 1  LTR 15x2, PPT 15x2, supine ball squeeze 15x2, supine march 15x2, supine clams 15x2, SAQ 15x2, hip abd w/tband (green) 15x2, scap squeeze 15x2, GS 10x2, QS x10  -MARGUERITE      Cueing 1  Verbal;Tactile;Demo  -MARGUERITE      Time 1  25 min  -MARGUERITE        User Key  (r) = Recorded By, (t) = Taken By, (c) = Cosigned By    Initials Name Provider Type    Shanda Boyd PTA Physical Therapy Assistant                           Therapy Education  Given: HEP, Symptoms/condition management, Pain management, Posture/body mechanics  Program: Reinforced  How Provided: Verbal, Demonstration  Provided to: Patient  Level of Understanding: Verbalized, Demonstrated              Time Calculation:   Start Time: 1510  Stop Time: 1603  Time Calculation (min): 53 min  Therapy Charges for Today     Code Description Service Date Service Provider Modifiers Qty    15872879548 HC PT THER PROC EA 15 MIN 5/16/2019 Shanda Frausto PTA GP 2    [id number] HC PT ELECTRICAL STIM UNATTENDED 5/16/2019 Shanda Frausto PTA  1                    Shanda Alvarado.  Jett, PTA  5/16/2019

## 2019-05-20 ENCOUNTER — HOSPITAL ENCOUNTER (OUTPATIENT)
Dept: PHYSICAL THERAPY | Facility: HOSPITAL | Age: 44
Setting detail: THERAPIES SERIES
Discharge: HOME | End: 2019-05-20

## 2019-05-20 DIAGNOSIS — M25.552 LEFT HIP PAIN: ICD-10-CM

## 2019-05-20 DIAGNOSIS — M54.41 CHRONIC BILATERAL LOW BACK PAIN WITH RIGHT-SIDED SCIATICA: Primary | ICD-10-CM

## 2019-05-20 DIAGNOSIS — G89.29 CHRONIC BILATERAL LOW BACK PAIN WITH RIGHT-SIDED SCIATICA: Primary | ICD-10-CM

## 2019-05-20 PROCEDURE — 97110 THERAPEUTIC EXERCISES: CPT

## 2019-05-20 PROCEDURE — G0283 ELEC STIM OTHER THAN WOUND: HCPCS

## 2019-05-20 NOTE — THERAPY TREATMENT NOTE
Outpatient Physical Therapy Ortho Treatment Note   Pedro     Patient Name: Ashley Bartlett  : 1975  MRN: [id number]  Today's Date: 2019      Visit Date: 2019    Visit Dx:    ICD-10-CM ICD-9-CM   1. Chronic bilateral low back pain with right-sided sciatica M54.41 724.2    G89.29 724.3     338.29   2. Left hip pain M25.552 719.45       Patient Active Problem List   Diagnosis   • Anxiety   • JAY (obstructive sleep apnea)   • Hypertension   • Depression   • Seasonal allergies   • Joint pain   • Heartburn   • Lower extremity edema   • Shortness of breath   • Paresthesia of both hands   • Palpitations   • Migraines   • Prediabetes   • Boil   • Fatigue   • Morbid obesity with BMI of 60.0-69.9, adult (CMS/[facility])   • Dyspepsia        Past Medical History:   Diagnosis Date   • Anxiety    • Boil     h/o multiple boils, has required I&D, unknown staph or MRSA   • Depression    • Dyspepsia    • Fatigue    • GERD (gastroesophageal reflux disease)    • Heartburn     tums/ rolaids prn, EGD years ago- unremarkable.  Denies h/o h pylori.    • Hypertension    • Joint pain     knees, hips, shoulders, elbows, wrists.   Takes ibuprofen PRN, muscle relaxers. No injections.    • Lower extremity edema    • Migraines     ibuprofen or tylenol PRN.    • Migraines    • Morbid obesity with BMI of 60.0-69.9, adult (CMS/[facility])    • JAY (obstructive sleep apnea)     does not use CPAP   • Palpitations     negative holter monitor/ cardiac workup. Has seen cardiologist a year ago.    • Paresthesia of both hands    • Prediabetes     was on metformin, better controlled and is off this now.    • Seasonal allergies    • Shortness of breath     wtih exertion        Past Surgical History:   Procedure Laterality Date   • BREAST BIOPSY Right 2015    fibroadenoma   •  SECTION  ,     midline   • ENDOSCOPY  2018    Procedure: ESOPHAGOGASTRODUODENOSCOPY WITH BIOPSY;  Surgeon: Isaiah Alves MD;  Location: [facility]  ENDOSCOPY;  Service: Gastroenterology   • LAPAROSCOPIC CHOLECYSTECTOMY      cholelithiasis   • TONSILLECTOMY     • TUBAL ABDOMINAL LIGATION      with  section       PT Ortho     Row Name 19 1500       Subjective Comments    Subjective Comments  Patient states that she is having less back pain and having more pain in her knees.  -AC       Subjective Pain    Able to rate subjective pain?  yes  -AC    Pre-Treatment Pain Level  3  -AC      User Key  (r) = Recorded By, (t) = Taken By, (c) = Cosigned By    Initials Name Provider Type    Cristy Jordan PTA Physical Therapy Assistant                      PT Assessment/Plan     Row Name 19 1515          PT Assessment    Assessment Comments  Patient tolerated treatment session well with rest breaks taken as needed by the patient. Educated patient to perform ther-ex per her tolerance, patient verbalized understanding. Treatment session kept consistent secondary to patient's reports of increased pain.  No adverse reactions with modalities or treatment session. Increased pain noted post treatment session.   -AC        PT Plan    PT Plan Comments  Continue per PT's POC, progress per the patient's tolerance.  -AC       User Key  (r) = Recorded By, (t) = Taken By, (c) = Cosigned By    Initials Name Provider Type    Cristy Jordan PTA Physical Therapy Assistant          Modalities     Row Name 19 1500             Moist Heat    MH Applied  Yes No redness noted following moist heat  -AC      Location  low back  -AC      Rx Minutes  15 mins  -AC      MH Prior to Rx  Yes  -AC         Ice    Ice Applied  Yes  -AC      Location  low back  -AC      Rx Minutes  10 mins  -AC      Ice S/P Rx  Yes  -AC         ELECTRICAL STIMULATION    Attended/Unattended  Unattended No irritation noted following estim  -AC      Stimulation Type  Pre-Mod  -AC      Max mAmp  -- per the patient's tolerance, 15 minutes with MH  -AC       Location/Electrode Placement/Other  lumbar   -       PT E-Stim Unattended (Manual) Minutes  15  -AC        User Key  (r) = Recorded By, (t) = Taken By, (c) = Cosigned By    Initials Name Provider Type    Cristy Jordan PTA Physical Therapy Assistant        Exercises     Row Name 05/20/19 1500             Subjective Comments    Subjective Comments  Patient states that she is having less back pain and having more pain in her knees.  -AC         Subjective Pain    Able to rate subjective pain?  yes  -AC      Pre-Treatment Pain Level  3  -AC         Total Minutes    70578 - PT Therapeutic Exercise Minutes  25  -AC         Exercise 1    Exercise Name 1  LTR 15x2, PPT 15x2, supine ball squeeze 15x2, supine march 15x2, supine clams 15x2, SAQ 15x2, hip abd w/tband (green) 15x2, scap squeeze 15x2, GS 10x2, QS x10  -AC      Cueing 1  Verbal;Tactile;Demo  -AC      Time 1  25 min  -AC        User Key  (r) = Recorded By, (t) = Taken By, (c) = Cosigned By    Initials Name Provider Type    Cristy Jordan PTA Physical Therapy Assistant                           Therapy Education  Given: HEP, Symptoms/condition management, Pain management, Posture/body mechanics  Program: Reinforced  How Provided: Verbal, Demonstration  Provided to: Patient  Level of Understanding: Demonstrated, Verbalized              Time Calculation:   Start Time: 1505  Stop Time: 1600  Time Calculation (min): 55 min  Therapy Charges for Today     Code Description Service Date Service Provider Modifiers Qty    59374548520  PT THER PROC EA 15 MIN 5/20/2019 Cristy Oneal PTA GP 2    [id number]  PT ELECTRICAL STIM UNATTENDED 5/20/2019 Cristy Oneal PTA  1                    Cristy Oneal PTA  5/20/2019

## 2019-06-06 ENCOUNTER — HOSPITAL ENCOUNTER (OUTPATIENT)
Dept: PHYSICAL THERAPY | Facility: HOSPITAL | Age: 44
Setting detail: THERAPIES SERIES
Discharge: HOME | End: 2019-06-06

## 2019-06-06 DIAGNOSIS — M25.552 LEFT HIP PAIN: ICD-10-CM

## 2019-06-06 DIAGNOSIS — M54.41 CHRONIC BILATERAL LOW BACK PAIN WITH RIGHT-SIDED SCIATICA: Primary | ICD-10-CM

## 2019-06-06 DIAGNOSIS — G89.29 CHRONIC BILATERAL LOW BACK PAIN WITH RIGHT-SIDED SCIATICA: Primary | ICD-10-CM

## 2019-06-06 PROCEDURE — G0283 ELEC STIM OTHER THAN WOUND: HCPCS

## 2019-06-06 PROCEDURE — 97110 THERAPEUTIC EXERCISES: CPT

## 2019-06-06 NOTE — THERAPY TREATMENT NOTE
Outpatient Physical Therapy Ortho Treatment Note   Pedro     Patient Name: Ashley Bartlett  : 1975  MRN: [id number]  Today's Date: 2019      Visit Date: 2019    Visit Dx:    ICD-10-CM ICD-9-CM   1. Chronic bilateral low back pain with right-sided sciatica M54.41 724.2    G89.29 724.3     338.29   2. Left hip pain M25.552 719.45       Patient Active Problem List   Diagnosis   • Anxiety   • JAY (obstructive sleep apnea)   • Hypertension   • Depression   • Seasonal allergies   • Joint pain   • Heartburn   • Lower extremity edema   • Shortness of breath   • Paresthesia of both hands   • Palpitations   • Migraines   • Prediabetes   • Boil   • Fatigue   • Morbid obesity with BMI of 60.0-69.9, adult (CMS/[facility])   • Dyspepsia        Past Medical History:   Diagnosis Date   • Anxiety    • Boil     h/o multiple boils, has required I&D, unknown staph or MRSA   • Depression    • Dyspepsia    • Fatigue    • GERD (gastroesophageal reflux disease)    • Heartburn     tums/ rolaids prn, EGD years ago- unremarkable.  Denies h/o h pylori.    • Hypertension    • Joint pain     knees, hips, shoulders, elbows, wrists.   Takes ibuprofen PRN, muscle relaxers. No injections.    • Lower extremity edema    • Migraines     ibuprofen or tylenol PRN.    • Migraines    • Morbid obesity with BMI of 60.0-69.9, adult (CMS/[facility])    • JAY (obstructive sleep apnea)     does not use CPAP   • Palpitations     negative holter monitor/ cardiac workup. Has seen cardiologist a year ago.    • Paresthesia of both hands    • Prediabetes     was on metformin, better controlled and is off this now.    • Seasonal allergies    • Shortness of breath     wtih exertion        Past Surgical History:   Procedure Laterality Date   • BREAST BIOPSY Right 2015    fibroadenoma   •  SECTION  ,     midline   • ENDOSCOPY  2018    Procedure: ESOPHAGOGASTRODUODENOSCOPY WITH BIOPSY;  Surgeon: Isaiah Alves MD;  Location: [facility]  ENDOSCOPY;  Service: Gastroenterology   • LAPAROSCOPIC CHOLECYSTECTOMY      cholelithiasis   • TONSILLECTOMY     • TUBAL ABDOMINAL LIGATION      with  section       PT Ortho     Row Name 19 1300       Subjective Comments    Subjective Comments  Patient states she was unable to attend therapy last week due to sickness.  Pt reports 4/10 low back pain prior to tx.   -MARGUERITE       Subjective Pain    Able to rate subjective pain?  yes  -MARGUERITE    Pre-Treatment Pain Level  4  -MARGUERITE      User Key  (r) = Recorded By, (t) = Taken By, (c) = Cosigned By    Initials Name Provider Type    Shanda Boyd PTA Physical Therapy Assistant                      PT Assessment/Plan     Row Name 19 1414          PT Assessment    Assessment Comments  Patient completed today's tx w/ reports of slight decrease in pain following, 3/10.  Pt received MH and Estim to low back in seated position f/b therex as listed for improved lumbar ROM, lumbar stability, and postural strength.  Treatment concluded with cryotherapy.  Postural and back strengthening activities progressed w/ initiation of midrow w/tband; pt noted w/ good tolerance.  Pt will be progressed as tolerated.  No adverse reactions observed following modalities.   -MARGUERITE        PT Plan    PT Plan Comments  Continue with PT's POC and progress tx as tolerated by patient.   -MARGUERITE       User Key  (r) = Recorded By, (t) = Taken By, (c) = Cosigned By    Initials Name Provider Type    Shanda Boyd PTA Physical Therapy Assistant          Modalities     Row Name 19 1300             Moist Heat    MH Applied  Yes no redness observed following MH  -MARGUERITE      Location  low back  -MARGUERITE      Rx Minutes  15 mins  -MARGUERITE      MH Prior to Rx  Yes w/ estim in seated position  -MARGUERITE         Ice    Ice Applied  Yes  -MARGUERITE      Location  low back  -MARGUERITE      Rx Minutes  10 mins  -MARGUERITE      Ice S/P Rx  Yes  -MARGUERITE         ELECTRICAL STIMULATION    Attended/Unattended  Unattended  no skin irritation observed following estim  -MARGUERITE      Stimulation Type  Pre-Mod  -MARGUERITE      Max mAmp  -- as to pt's tolerance  -MARGUERITE      Location/Electrode Placement/Other  lumbar   -MARGUERITE       PT E-Stim Unattended (Manual) Minutes  15  -MARGUERITE        User Key  (r) = Recorded By, (t) = Taken By, (c) = Cosigned By    Initials Name Provider Type    Shanda Body PTA Physical Therapy Assistant        Exercises     Row Name 06/06/19 1300             Subjective Comments    Subjective Comments  Patient states she was unable to attend therapy last week due to sickness.  Pt reports 4/10 low back pain prior to tx.   -MARGUERITE         Subjective Pain    Able to rate subjective pain?  yes  -MARGUERITE      Pre-Treatment Pain Level  4  -MARGUERITE         Total Minutes    95574 - PT Therapeutic Exercise Minutes  30  -MARGUERITE         Exercise 1    Exercise Name 1  LTR 15x2, PPT 15x2, ball squeeze 15x2, supine march 10x3, supine clams 15x2, SAQ 15x2, GS 15x2, scap squeeze 15x2, hip abd w/ tband (green) 15x2, midrow w/tband (yellow) 15x2  -MARGUERITE      Cueing 1  Verbal;Tactile;Demo  -MARGUERITE      Time 1  30 min  -MARGUERITE        User Key  (r) = Recorded By, (t) = Taken By, (c) = Cosigned By    Initials Name Provider Type    Shanda Boyd PTA Physical Therapy Assistant                           Therapy Education  Given: HEP, Symptoms/condition management, Pain management, Posture/body mechanics  Program: Reinforced  How Provided: Verbal, Demonstration  Provided to: Patient  Level of Understanding: Verbalized, Demonstrated              Time Calculation:   Start Time: 1300  Stop Time: 1358  Time Calculation (min): 58 min  Therapy Charges for Today     Code Description Service Date Service Provider Modifiers Qty    92788592989 HC PT THER PROC EA 15 MIN 6/6/2019 Shanda Frausto PTA GP 2    [id number]  PT ELECTRICAL STIM UNATTENDED 6/6/2019 Shanda Frausto PTA  1                    Shanda Alvarado. WES Frausto  6/6/2019

## 2019-06-10 ENCOUNTER — APPOINTMENT (OUTPATIENT)
Dept: PHYSICAL THERAPY | Facility: HOSPITAL | Age: 44
End: 2019-06-10

## 2019-07-11 ENCOUNTER — DOCUMENTATION (OUTPATIENT)
Dept: PHYSICAL THERAPY | Facility: HOSPITAL | Age: 44
End: 2019-07-11

## 2019-07-11 DIAGNOSIS — G89.29 CHRONIC BILATERAL LOW BACK PAIN WITH RIGHT-SIDED SCIATICA: Primary | ICD-10-CM

## 2019-07-11 DIAGNOSIS — M25.552 LEFT HIP PAIN: ICD-10-CM

## 2019-07-11 DIAGNOSIS — M54.41 CHRONIC BILATERAL LOW BACK PAIN WITH RIGHT-SIDED SCIATICA: Primary | ICD-10-CM

## 2019-07-11 NOTE — THERAPY DISCHARGE NOTE
Outpatient Physical Therapy Ortho Treatment Note/Discharge Summary       Patient Name: Ashley Bartlett  : 1975  MRN: [id number]  Today's Date: 2019      Visit Date: 2019    Visit Dx:    ICD-10-CM ICD-9-CM   1. Chronic bilateral low back pain with right-sided sciatica M54.41 724.2    G89.29 724.3     338.29   2. Left hip pain M25.552 719.45       Patient Active Problem List   Diagnosis   • Anxiety   • JAY (obstructive sleep apnea)   • Hypertension   • Depression   • Seasonal allergies   • Joint pain   • Heartburn   • Lower extremity edema   • Shortness of breath   • Paresthesia of both hands   • Palpitations   • Migraines   • Prediabetes   • Boil   • Fatigue   • Morbid obesity with BMI of 60.0-69.9, adult (CMS/[facility])   • Dyspepsia        Past Medical History:   Diagnosis Date   • Anxiety    • Boil     h/o multiple boils, has required I&D, unknown staph or MRSA   • Depression    • Dyspepsia    • Fatigue    • GERD (gastroesophageal reflux disease)    • Heartburn     tums/ rolaids prn, EGD years ago- unremarkable.  Denies h/o h pylori.    • Hypertension    • Joint pain     knees, hips, shoulders, elbows, wrists.   Takes ibuprofen PRN, muscle relaxers. No injections.    • Lower extremity edema    • Migraines     ibuprofen or tylenol PRN.    • Migraines    • Morbid obesity with BMI of 60.0-69.9, adult (CMS/[facility])    • JAY (obstructive sleep apnea)     does not use CPAP   • Palpitations     negative holter monitor/ cardiac workup. Has seen cardiologist a year ago.    • Paresthesia of both hands    • Prediabetes     was on metformin, better controlled and is off this now.    • Seasonal allergies    • Shortness of breath     wtih exertion        Past Surgical History:   Procedure Laterality Date   • BREAST BIOPSY Right 2015    fibroadenoma   •  SECTION  ,     midline   • ENDOSCOPY  2018    Procedure: ESOPHAGOGASTRODUODENOSCOPY WITH BIOPSY;  Surgeon: Isaiah Alves MD;  Location:  [facility] ENDOSCOPY;  Service: Gastroenterology   • LAPAROSCOPIC CHOLECYSTECTOMY      cholelithiasis   • TONSILLECTOMY     • TUBAL ABDOMINAL LIGATION      with  section                                                  PT OP Goals     Row Name 19 1000          PT Short Term Goals    STG Date to Achieve  19  -MARGUERITE     STG 1  Pt will be instructed in a HEP.  -MARGUERITE     STG 1 Progress  Met  -MARGUERITE     STG 2  Pt will report pain no greater than 5/10.  -MARGUERITE     STG 2 Progress Comments  unable to assess  -MARGUERITE     STG 3  Pt will improved her Mod Oswesry by 10%.  -MARGUERITE     STG 3 Progress Comments  unable to assess  -MARGUERITE        Long Term Goals    LTG Date to Achieve  19  -MARGUERITE     LTG 1  Pt will report pain no greater than 3/10 with ADls.  -MARGUERITE     LTG 1 Progress Comments  unable to assess  -MARGUERITE     LTG 2  Pt will be able to stand for 10min without increased pain.  -MARGUERITE     LTG 2 Progress Comments  unable to assess  -MARGUERITE     LTG 3  Pt will improve her LEFS to 50% or less.  -MARGUERITE     LTG 3 Progress Comments  unable to assess  -MARGUERITE       User Key  (r) = Recorded By, (t) = Taken By, (c) = Cosigned By    Initials Name Provider Type    Shanda Boyd, PTA Physical Therapy Assistant                         Time Calculation:                OP PT Discharge Summary  Date of Discharge: 19  Reason for Discharge: other (comment)(Patient failed to continue w/ therapy services)  Outcomes Achieved: Patient able to partially acheive established goals  Discharge Destination: Unknown  Discharge Instructions/Additional Comments: Patient discharged from therapy services at this time due to failure to continue.  Patient attended a total of 10 PT visits including initial evaluation.  Unable to assess all goals secondary to failure to continue w/ therapy.  Thank you for your referral.        Shanda Frausto PTA  2019

## 2020-05-06 ENCOUNTER — HOSPITAL ENCOUNTER (EMERGENCY)
Facility: HOSPITAL | Age: 45
Discharge: HOME | End: 2020-05-06
Attending: EMERGENCY MEDICINE | Admitting: EMERGENCY MEDICINE

## 2020-05-06 VITALS
RESPIRATION RATE: 16 BRPM | HEART RATE: 80 BPM | SYSTOLIC BLOOD PRESSURE: 145 MMHG | OXYGEN SATURATION: 97 % | BODY MASS INDEX: 44.41 KG/M2 | WEIGHT: 293 LBS | HEIGHT: 68 IN | DIASTOLIC BLOOD PRESSURE: 73 MMHG | TEMPERATURE: 98.7 F

## 2020-05-06 DIAGNOSIS — K04.7 DENTAL ABSCESS: Primary | ICD-10-CM

## 2020-05-06 PROCEDURE — 99283 EMERGENCY DEPT VISIT LOW MDM: CPT

## 2020-05-06 PROCEDURE — 96372 THER/PROPH/DIAG INJ SC/IM: CPT

## 2020-05-06 PROCEDURE — 25010000002 KETOROLAC TROMETHAMINE PER 15 MG: Performed by: PHYSICIAN ASSISTANT

## 2020-05-06 RX ORDER — CLINDAMYCIN HYDROCHLORIDE 150 MG/1
600 CAPSULE ORAL ONCE
Status: COMPLETED | OUTPATIENT
Start: 2020-05-06 | End: 2020-05-06

## 2020-05-06 RX ORDER — KETOROLAC TROMETHAMINE 30 MG/ML
30 INJECTION, SOLUTION INTRAMUSCULAR; INTRAVENOUS ONCE
Status: COMPLETED | OUTPATIENT
Start: 2020-05-06 | End: 2020-05-06

## 2020-05-06 RX ORDER — CLINDAMYCIN HYDROCHLORIDE 300 MG/1
300 CAPSULE ORAL 3 TIMES DAILY
Qty: 30 CAPSULE | Refills: 0 | Status: SHIPPED | OUTPATIENT
Start: 2020-05-06 | End: 2021-06-28

## 2020-05-06 RX ADMIN — KETOROLAC TROMETHAMINE 30 MG: 30 INJECTION, SOLUTION INTRAMUSCULAR; INTRAVENOUS at 22:10

## 2020-05-06 RX ADMIN — CLINDAMYCIN HYDROCHLORIDE 600 MG: 150 CAPSULE ORAL at 22:09

## 2020-05-07 NOTE — ED PROVIDER NOTES
Subjective     History provided by:  Patient   used: No    Dental Pain   Location:  Upper  Upper teeth location:  2/RU 2nd molar  Quality:  Constant  Severity:  Moderate  Duration:  1 day  Timing:  Constant  Progression:  Worsening  Chronicity:  New  Context: abscess    Context: not dental caries, not dental fracture and normal dentition    Context comment:  Previous dental work on same tooth  Relieved by:  Nothing  Worsened by:  Touching  Ineffective treatments:  None tried  Associated symptoms: facial pain, facial swelling and gum swelling    Associated symptoms: no difficulty swallowing, no fever, no neck pain, no neck swelling and no trismus    Risk factors: sufficient dental care and no smoking        Review of Systems   Constitutional: Negative.  Negative for fever.   HENT: Positive for dental problem and facial swelling.    Respiratory: Negative.    Cardiovascular: Negative.  Negative for chest pain.   Gastrointestinal: Negative.  Negative for abdominal pain.   Endocrine: Negative.    Genitourinary: Negative.  Negative for dysuria.   Musculoskeletal: Negative for neck pain.   Skin: Negative.    Neurological: Negative.    Psychiatric/Behavioral: Negative.    All other systems reviewed and are negative.      Past Medical History:   Diagnosis Date   • Anxiety    • Boil     h/o multiple boils, has required I&D, unknown staph or MRSA   • Depression    • Dyspepsia    • Fatigue    • GERD (gastroesophageal reflux disease)    • Heartburn     tums/ rolaids prn, EGD years ago- unremarkable.  Denies h/o h pylori.    • Hypertension    • Joint pain     knees, hips, shoulders, elbows, wrists.   Takes ibuprofen PRN, muscle relaxers. No injections.    • Lower extremity edema    • Migraines     ibuprofen or tylenol PRN.    • Migraines    • Morbid obesity with BMI of 60.0-69.9, adult (CMS/[facility])    • JAY (obstructive sleep apnea)     does not use CPAP   • Palpitations     negative holter monitor/ cardiac  workup. Has seen cardiologist a year ago.    • Paresthesia of both hands    • Prediabetes     was on metformin, better controlled and is off this now.    • Seasonal allergies    • Shortness of breath     wtih exertion       No Known Allergies    Past Surgical History:   Procedure Laterality Date   • BREAST BIOPSY Right 2015    fibroadenoma   •  SECTION  ,     midline   • ENDOSCOPY  2018    Procedure: ESOPHAGOGASTRODUODENOSCOPY WITH BIOPSY;  Surgeon: Isaiah Alves MD;  Location: [facility] ENDOSCOPY;  Service: Gastroenterology   • LAPAROSCOPIC CHOLECYSTECTOMY      cholelithiasis   • TONSILLECTOMY     • TUBAL ABDOMINAL LIGATION      with  section       Family History   Problem Relation Age of Onset   • Diabetes Mother    • Hypertension Mother    • Diabetes Father    • Hypertension Father    • Obesity Maternal Grandmother    • Diabetes Maternal Grandmother    • Hypertension Maternal Grandmother    • Heart attack Maternal Grandmother    • Diabetes Maternal Grandfather    • Heart attack Maternal Grandfather    • Obesity Paternal Grandmother    • Hypertension Paternal Grandmother    • Heart attack Paternal Grandmother    • Hypertension Paternal Grandfather    • Heart attack Paternal Grandfather    • Breast cancer Neg Hx        Social History     Socioeconomic History   • Marital status:      Spouse name: Not on file   • Number of children: 2   • Years of education: High School    • Highest education level: Not on file   Tobacco Use   • Smoking status: Never Smoker   • Smokeless tobacco: Never Used   Substance and Sexual Activity   • Alcohol use: Yes     Comment: very little, 4oz in 6 months   • Drug use: No   • Sexual activity: Defer     Birth control/protection: Surgical     Comment: no hormones   Social History Narrative    Lives near [facility] ([facility] with mother.   Disabled from knee and joint pain.           Objective   Physical Exam   Constitutional: She is  oriented to person, place, and time. She appears well-developed and well-nourished. No distress.   HENT:   Head: Normocephalic and atraumatic.       Right Ear: External ear normal.   Left Ear: External ear normal.   Nose: Nose normal. Right sinus exhibits no maxillary sinus tenderness and no frontal sinus tenderness. Left sinus exhibits no maxillary sinus tenderness and no frontal sinus tenderness.   Mouth/Throat: Dental abscesses present. No dental caries.       Eyes: Pupils are equal, round, and reactive to light. Conjunctivae and EOM are normal.   Neck: Normal range of motion. Neck supple. No JVD present. No tracheal deviation present.   Cardiovascular: Normal rate, regular rhythm and normal heart sounds.   No murmur heard.  Pulmonary/Chest: Effort normal and breath sounds normal. No respiratory distress. She has no wheezes.   Abdominal: Soft. Bowel sounds are normal. There is no tenderness.   Musculoskeletal: Normal range of motion. She exhibits no edema or deformity.   Neurological: She is alert and oriented to person, place, and time. No cranial nerve deficit.   Skin: Skin is warm and dry. No rash noted. She is not diaphoretic. No erythema. No pallor.   Psychiatric: She has a normal mood and affect. Her behavior is normal. Thought content normal.   Nursing note and vitals reviewed.      Procedures           ED Course                                           MDM  Number of Diagnoses or Management Options  Dental abscess: new and does not require workup  Risk of Complications, Morbidity, and/or Mortality  Presenting problems: low  Diagnostic procedures: minimal  Management options: moderate    Patient Progress  Patient progress: stable      Final diagnoses:   Dental abscess            Renato Heredia PA-C  05/06/20 2235       Renato Heredia PA-C  05/06/20 2237

## 2021-05-17 DIAGNOSIS — M25.562 PAIN IN BOTH KNEES, UNSPECIFIED CHRONICITY: Primary | ICD-10-CM

## 2021-05-17 DIAGNOSIS — M25.561 PAIN IN BOTH KNEES, UNSPECIFIED CHRONICITY: Primary | ICD-10-CM

## 2021-06-28 ENCOUNTER — HOSPITAL ENCOUNTER (OUTPATIENT)
Dept: GENERAL RADIOLOGY | Facility: HOSPITAL | Age: 46
Discharge: HOME | End: 2021-06-28
Admitting: ORTHOPAEDIC SURGERY

## 2021-06-28 ENCOUNTER — OFFICE VISIT (OUTPATIENT)
Dept: ORTHOPEDIC SURGERY | Facility: CLINIC | Age: 46
End: 2021-06-28

## 2021-06-28 VITALS
SYSTOLIC BLOOD PRESSURE: 152 MMHG | DIASTOLIC BLOOD PRESSURE: 100 MMHG | HEART RATE: 82 BPM | BODY MASS INDEX: 44.41 KG/M2 | WEIGHT: 293 LBS | HEIGHT: 68 IN

## 2021-06-28 DIAGNOSIS — E66.01 MORBID OBESITY WITH BMI OF 60.0-69.9, ADULT (HCC): ICD-10-CM

## 2021-06-28 DIAGNOSIS — M25.562 PAIN IN BOTH KNEES, UNSPECIFIED CHRONICITY: ICD-10-CM

## 2021-06-28 DIAGNOSIS — M17.0 PRIMARY OSTEOARTHRITIS OF BOTH KNEES: Primary | ICD-10-CM

## 2021-06-28 DIAGNOSIS — M25.561 PAIN IN BOTH KNEES, UNSPECIFIED CHRONICITY: ICD-10-CM

## 2021-06-28 PROCEDURE — 73562 X-RAY EXAM OF KNEE 3: CPT | Performed by: RADIOLOGY

## 2021-06-28 PROCEDURE — 73562 X-RAY EXAM OF KNEE 3: CPT

## 2021-06-28 PROCEDURE — 99203 OFFICE O/P NEW LOW 30 MIN: CPT | Performed by: ORTHOPAEDIC SURGERY

## 2021-06-28 RX ORDER — ROSUVASTATIN CALCIUM 10 MG/1
10 TABLET, COATED ORAL DAILY
COMMUNITY
Start: 2021-05-27

## 2021-06-28 RX ORDER — FUROSEMIDE 20 MG/1
TABLET ORAL
COMMUNITY
Start: 2021-05-28

## 2022-01-03 PROBLEM — U07.1 CLINICAL DIAGNOSIS OF SEVERE ACUTE RESPIRATORY SYNDROME CORONAVIRUS 2 (SARS-COV-2) DISEASE: Status: ACTIVE | Noted: 2022-01-03

## 2022-01-03 RX ORDER — METHYLPREDNISOLONE SODIUM SUCCINATE 125 MG/2ML
125 INJECTION, POWDER, LYOPHILIZED, FOR SOLUTION INTRAMUSCULAR; INTRAVENOUS AS NEEDED
Status: CANCELLED | OUTPATIENT
Start: 2022-01-04

## 2022-01-03 RX ORDER — EPINEPHRINE 1 MG/ML
0.3 INJECTION, SOLUTION INTRAMUSCULAR; SUBCUTANEOUS AS NEEDED
Status: CANCELLED | OUTPATIENT
Start: 2022-01-04

## 2022-01-03 RX ORDER — DIPHENHYDRAMINE HCL 50 MG
50 CAPSULE ORAL ONCE AS NEEDED
Status: CANCELLED | OUTPATIENT
Start: 2022-01-04

## 2022-01-03 RX ORDER — DIPHENHYDRAMINE HYDROCHLORIDE 50 MG/ML
50 INJECTION INTRAMUSCULAR; INTRAVENOUS ONCE AS NEEDED
Status: CANCELLED | OUTPATIENT
Start: 2022-01-04

## 2022-01-04 ENCOUNTER — HOSPITAL ENCOUNTER (OUTPATIENT)
Dept: INFUSION THERAPY | Facility: HOSPITAL | Age: 47
Discharge: HOME | End: 2022-01-04
Admitting: PHYSICIAN ASSISTANT

## 2022-01-04 VITALS
HEART RATE: 75 BPM | SYSTOLIC BLOOD PRESSURE: 94 MMHG | OXYGEN SATURATION: 98 % | DIASTOLIC BLOOD PRESSURE: 52 MMHG | TEMPERATURE: 98 F

## 2022-01-04 DIAGNOSIS — U07.1 CLINICAL DIAGNOSIS OF SEVERE ACUTE RESPIRATORY SYNDROME CORONAVIRUS 2 (SARS-COV-2) DISEASE: Primary | ICD-10-CM

## 2022-01-04 PROCEDURE — 25010000002 INJECTION, BAMLANIVIMAB AND ETESEVIMAB, 2100 MG: Performed by: PHYSICIAN ASSISTANT

## 2022-01-04 PROCEDURE — M0245 HC IV INFUSION, BAMLANIVIMAB AND ETESEVIMAB, 2100 MG: HCPCS | Performed by: PHYSICIAN ASSISTANT

## 2022-01-04 RX ORDER — EPINEPHRINE 1 MG/ML
0.3 INJECTION, SOLUTION INTRAMUSCULAR; SUBCUTANEOUS AS NEEDED
OUTPATIENT
Start: 2022-01-04

## 2022-01-04 RX ORDER — METHYLPREDNISOLONE SODIUM SUCCINATE 125 MG/2ML
125 INJECTION, POWDER, LYOPHILIZED, FOR SOLUTION INTRAMUSCULAR; INTRAVENOUS AS NEEDED
Status: DISCONTINUED | OUTPATIENT
Start: 2022-01-04 | End: 2022-01-06 | Stop reason: HOSPADM

## 2022-01-04 RX ORDER — DIPHENHYDRAMINE HCL 50 MG
50 CAPSULE ORAL ONCE AS NEEDED
Status: DISCONTINUED | OUTPATIENT
Start: 2022-01-04 | End: 2022-01-06 | Stop reason: HOSPADM

## 2022-01-04 RX ORDER — DIPHENHYDRAMINE HYDROCHLORIDE 50 MG/ML
50 INJECTION INTRAMUSCULAR; INTRAVENOUS ONCE AS NEEDED
Status: DISCONTINUED | OUTPATIENT
Start: 2022-01-04 | End: 2022-01-06 | Stop reason: HOSPADM

## 2022-01-04 RX ORDER — DIPHENHYDRAMINE HYDROCHLORIDE 50 MG/ML
50 INJECTION INTRAMUSCULAR; INTRAVENOUS ONCE AS NEEDED
OUTPATIENT
Start: 2022-01-04

## 2022-01-04 RX ORDER — DIPHENHYDRAMINE HCL 50 MG
50 CAPSULE ORAL ONCE AS NEEDED
OUTPATIENT
Start: 2022-01-04

## 2022-01-04 RX ORDER — EPINEPHRINE 1 MG/ML
0.3 INJECTION, SOLUTION INTRAMUSCULAR; SUBCUTANEOUS AS NEEDED
Status: DISCONTINUED | OUTPATIENT
Start: 2022-01-04 | End: 2022-01-06 | Stop reason: HOSPADM

## 2022-01-04 RX ORDER — METHYLPREDNISOLONE SODIUM SUCCINATE 125 MG/2ML
125 INJECTION, POWDER, LYOPHILIZED, FOR SOLUTION INTRAMUSCULAR; INTRAVENOUS AS NEEDED
OUTPATIENT
Start: 2022-01-04

## 2022-01-04 RX ADMIN — SODIUM CHLORIDE: 9 INJECTION, SOLUTION INTRAVENOUS at 11:36

## 2022-05-13 ENCOUNTER — APPOINTMENT (OUTPATIENT)
Dept: MAMMOGRAPHY | Facility: HOSPITAL | Age: 47
End: 2022-05-13

## 2023-04-14 ENCOUNTER — TRANSCRIBE ORDERS (OUTPATIENT)
Dept: ADMINISTRATIVE | Facility: HOSPITAL | Age: 48
End: 2023-04-14
Payer: COMMERCIAL

## 2023-04-14 DIAGNOSIS — Z12.31 VISIT FOR SCREENING MAMMOGRAM: Primary | ICD-10-CM

## 2023-05-12 ENCOUNTER — HOSPITAL ENCOUNTER (OUTPATIENT)
Dept: MAMMOGRAPHY | Facility: HOSPITAL | Age: 48
Discharge: HOME | End: 2023-05-12
Payer: COMMERCIAL

## 2023-10-08 ENCOUNTER — HOSPITAL ENCOUNTER (EMERGENCY)
Facility: HOSPITAL | Age: 48
Discharge: HOME | End: 2023-10-08
Attending: EMERGENCY MEDICINE | Admitting: EMERGENCY MEDICINE
Payer: COMMERCIAL

## 2023-10-08 ENCOUNTER — APPOINTMENT (OUTPATIENT)
Dept: GENERAL RADIOLOGY | Facility: HOSPITAL | Age: 48
End: 2023-10-08
Payer: COMMERCIAL

## 2023-10-08 VITALS
DIASTOLIC BLOOD PRESSURE: 79 MMHG | RESPIRATION RATE: 14 BRPM | WEIGHT: 293 LBS | HEIGHT: 68 IN | OXYGEN SATURATION: 99 % | TEMPERATURE: 97.9 F | SYSTOLIC BLOOD PRESSURE: 150 MMHG | BODY MASS INDEX: 44.41 KG/M2 | HEART RATE: 77 BPM

## 2023-10-08 DIAGNOSIS — M17.10 ARTHRITIS OF KNEE: Primary | ICD-10-CM

## 2023-10-08 PROCEDURE — 73562 X-RAY EXAM OF KNEE 3: CPT

## 2023-10-08 PROCEDURE — 73562 X-RAY EXAM OF KNEE 3: CPT | Performed by: RADIOLOGY

## 2023-10-08 PROCEDURE — 99283 EMERGENCY DEPT VISIT LOW MDM: CPT

## 2023-10-08 RX ORDER — IBUPROFEN 800 MG/1
800 TABLET ORAL EVERY 6 HOURS PRN
Qty: 30 TABLET | Refills: 0 | Status: SHIPPED | OUTPATIENT
Start: 2023-10-08

## 2023-10-08 NOTE — ED PROVIDER NOTES
Subjective   History of Present Illness  49 yo female pt presents to the ED with complaints of right knee pain. Pt states that she has arthritis in her knees bilaterally. Pt states she was walking down a hill and felt a pop anteriorly in her knee. Pt states that since then she has had worsening pain with movement and bearing weight. Pt denies any alleviating factors.       History provided by:  Patient   used: No        Review of Systems   Constitutional: Negative.    HENT: Negative.     Eyes: Negative.    Respiratory: Negative.     Cardiovascular: Negative.    Gastrointestinal: Negative.    Endocrine: Negative.    Genitourinary: Negative.    Musculoskeletal:         +R knee pain    Skin: Negative.    Allergic/Immunologic: Negative.    Neurological: Negative.    Hematological: Negative.    Psychiatric/Behavioral: Negative.     All other systems reviewed and are negative.      Past Medical History:   Diagnosis Date    Anxiety     Boil     h/o multiple boils, has required I&D, unknown staph or MRSA    Depression     Dyspepsia     Fatigue     GERD (gastroesophageal reflux disease)     Heartburn     tums/ rolaids prn, EGD years ago- unremarkable.  Denies h/o h pylori.     Hypertension     Hypertension     Joint pain     knees, hips, shoulders, elbows, wrists.   Takes ibuprofen PRN, muscle relaxers. No injections.     Lower extremity edema     Migraines     ibuprofen or tylenol PRN.     Migraines     Morbid obesity with BMI of 60.0-69.9, adult     JAY (obstructive sleep apnea)     does not use CPAP    Palpitations     negative holter monitor/ cardiac workup. Has seen cardiologist a year ago.     Paresthesia of both hands     Prediabetes     was on metformin, better controlled and is off this now.     Seasonal allergies     Shortness of breath     wtih exertion       No Known Allergies    Past Surgical History:   Procedure Laterality Date    BREAST BIOPSY Right 2015    fibroadenoma     SECTION   ,     midline    ENDOSCOPY  2018    Procedure: ESOPHAGOGASTRODUODENOSCOPY WITH BIOPSY;  Surgeon: Isaiah Alves MD;  Location: [facility] ENDOSCOPY;  Service: Gastroenterology    LAPAROSCOPIC CHOLECYSTECTOMY      cholelithiasis    TONSILLECTOMY  1978    TUBAL ABDOMINAL LIGATION      with  section       Family History   Problem Relation Age of Onset    Diabetes Mother     Hypertension Mother     Kidney disease Mother     Diabetes Father     Hypertension Father     Obesity Maternal Grandmother     Diabetes Maternal Grandmother     Hypertension Maternal Grandmother     Heart attack Maternal Grandmother     Diabetes Maternal Grandfather     Heart attack Maternal Grandfather     Obesity Paternal Grandmother     Hypertension Paternal Grandmother     Heart attack Paternal Grandmother     Hypertension Paternal Grandfather     Heart attack Paternal Grandfather     Breast cancer Neg Hx        Social History     Socioeconomic History    Marital status:     Number of children: 2    Years of education: High School    Tobacco Use    Smoking status: Never    Smokeless tobacco: Never   Vaping Use    Vaping Use: Never used   Substance and Sexual Activity    Alcohol use: Yes     Comment: very little, 4oz in 6 months    Drug use: No    Sexual activity: Defer     Birth control/protection: Surgical     Comment: no hormones           Objective   Physical Exam  Vitals reviewed.   Constitutional:       Appearance: Normal appearance. She is normal weight.   HENT:      Head: Normocephalic and atraumatic.      Right Ear: External ear normal.      Left Ear: External ear normal.      Nose: Nose normal.      Mouth/Throat:      Mouth: Mucous membranes are moist.      Pharynx: Oropharynx is clear.   Eyes:      Extraocular Movements: Extraocular movements intact.      Conjunctiva/sclera: Conjunctivae normal.      Pupils: Pupils are equal, round, and reactive to light.   Cardiovascular:      Rate and Rhythm:  Normal rate and regular rhythm.      Pulses: Normal pulses.      Heart sounds: Normal heart sounds.   Pulmonary:      Effort: Pulmonary effort is normal.      Breath sounds: Normal breath sounds.   Abdominal:      General: Abdomen is flat. Bowel sounds are normal.      Palpations: Abdomen is soft.   Musculoskeletal:         General: Normal range of motion.      Cervical back: Normal range of motion and neck supple.      Right knee: Tenderness present. Normal pulse.   Skin:     General: Skin is warm and dry.      Capillary Refill: Capillary refill takes less than 2 seconds.   Neurological:      General: No focal deficit present.      Mental Status: She is alert and oriented to person, place, and time. Mental status is at baseline.   Psychiatric:         Mood and Affect: Mood normal.         Behavior: Behavior normal.         Thought Content: Thought content normal.         Judgment: Judgment normal.         Procedures           ED Course  ED Course as of 10/08/23 1414   Sun Oct 08, 2023   1350 XR Knee 3 View Right [ML]      ED Course User Index  [ML] Daya Mtz PA           XR Knee 3 View Right    Result Date: 10/8/2023    Arthritic change, but no acute fracture or dislocation.   This report was finalized on 10/8/2023 1:45 PM by Dr. Heath Diaz MD.                                     Medical Decision Making  47 yo female pt presents to the ED with complaints of right knee pain. Pt states that she has arthritis in her knees bilaterally. Pt states she was walking down a hill and felt a pop anteriorly in her knee. Pt states that since then she has had worsening pain with movement and bearing weight. Pt denies any alleviating factors.   Negative imaging. Pt will f/u with ortho. Discussed sx and red flags that would warrant return to the ED.     Problems Addressed:  Arthritis of knee: chronic illness or injury    Amount and/or Complexity of Data Reviewed  Radiology: ordered. Decision-making details  documented in ED Course.        Final diagnoses:   Arthritis of knee       ED Disposition  ED Disposition       ED Disposition   Discharge    Condition   Stable    Comment   --               Rossi Lindo MD  [address] [place] [address]  [phone]    Schedule an appointment as soon as possible for a visit in 2 days      José Toussaint DO  [address] [place] [id number] [phone]    Schedule an appointment as soon as possible for a visit in 2 days           Medication List        New Prescriptions      ibuprofen 800 MG tablet  Commonly known as: ADVIL,MOTRIN  Take 1 tablet by mouth Every 6 (Six) Hours As Needed for Mild Pain.               Where to Get Your Medications        These medications were sent to [facility], [place] - [address] - [phone] [facility] [phone] [facility] [address] [address]      Phone: [phone]   ibuprofen 800 MG tablet            Daya Mtz PA  10/08/23 4180

## 2024-02-02 ENCOUNTER — HOSPITAL ENCOUNTER (OUTPATIENT)
Facility: HOSPITAL | Age: 49
Discharge: HOME | End: 2024-02-02
Admitting: INTERNAL MEDICINE
Payer: COMMERCIAL

## 2024-02-02 DIAGNOSIS — Z12.31 VISIT FOR SCREENING MAMMOGRAM: ICD-10-CM

## 2024-02-02 PROCEDURE — 77063 BREAST TOMOSYNTHESIS BI: CPT | Performed by: RADIOLOGY

## 2024-02-02 PROCEDURE — 77067 SCR MAMMO BI INCL CAD: CPT

## 2024-02-02 PROCEDURE — 77063 BREAST TOMOSYNTHESIS BI: CPT

## 2024-02-02 PROCEDURE — 77067 SCR MAMMO BI INCL CAD: CPT | Performed by: RADIOLOGY

## 2025-04-10 ENCOUNTER — HOSPITAL ENCOUNTER (OUTPATIENT)
Dept: MAMMOGRAPHY | Facility: HOSPITAL | Age: 50
Discharge: HOME | End: 2025-04-10
Admitting: INTERNAL MEDICINE
Payer: COMMERCIAL

## 2025-04-10 DIAGNOSIS — Z12.31 VISIT FOR SCREENING MAMMOGRAM: ICD-10-CM

## 2025-04-10 PROCEDURE — 77063 BREAST TOMOSYNTHESIS BI: CPT | Performed by: RADIOLOGY

## 2025-04-10 PROCEDURE — 77067 SCR MAMMO BI INCL CAD: CPT | Performed by: RADIOLOGY

## 2025-04-10 PROCEDURE — 77063 BREAST TOMOSYNTHESIS BI: CPT

## 2025-04-10 PROCEDURE — 77067 SCR MAMMO BI INCL CAD: CPT

## (undated) DEVICE — SINGLE-USE BIOPSY FORCEPS: Brand: RADIAL JAW 4

## (undated) DEVICE — THE BITE BLOCK MAXI, LATEX FREE STRAP IS USED TO PROTECT THE ENDOSCOPE INSERTION TUBE FROM BEING BITTEN BY THE PATIENT.

## (undated) DEVICE — Device: Brand: DEFENDO AIR/WATER/SUCTION AND BIOPSY VALVE

## (undated) DEVICE — CANN NASL CO2 DIVIDED A/